# Patient Record
Sex: FEMALE | Race: ASIAN | NOT HISPANIC OR LATINO | Employment: UNEMPLOYED | URBAN - METROPOLITAN AREA
[De-identification: names, ages, dates, MRNs, and addresses within clinical notes are randomized per-mention and may not be internally consistent; named-entity substitution may affect disease eponyms.]

---

## 2017-01-06 ENCOUNTER — OFFICE VISIT - HEALTHEAST (OUTPATIENT)
Dept: FAMILY MEDICINE | Facility: CLINIC | Age: 47
End: 2017-01-06

## 2017-01-06 DIAGNOSIS — M54.9 BACK PAIN: ICD-10-CM

## 2017-01-06 DIAGNOSIS — R30.0 DYSURIA: ICD-10-CM

## 2017-01-11 ENCOUNTER — OFFICE VISIT - HEALTHEAST (OUTPATIENT)
Dept: FAMILY MEDICINE | Facility: CLINIC | Age: 47
End: 2017-01-11

## 2017-01-11 DIAGNOSIS — Z12.31 OTHER SCREENING MAMMOGRAM: ICD-10-CM

## 2017-01-11 DIAGNOSIS — M25.50 ARTHRALGIA: ICD-10-CM

## 2017-01-11 DIAGNOSIS — Z23 NEED FOR TETANUS BOOSTER: ICD-10-CM

## 2017-01-11 DIAGNOSIS — M54.50 LOW BACK PAIN: ICD-10-CM

## 2017-01-11 DIAGNOSIS — R30.0 DYSURIA: ICD-10-CM

## 2017-01-23 ENCOUNTER — RECORDS - HEALTHEAST (OUTPATIENT)
Dept: MAMMOGRAPHY | Facility: CLINIC | Age: 47
End: 2017-01-23

## 2017-01-23 DIAGNOSIS — Z12.31 ENCOUNTER FOR SCREENING MAMMOGRAM FOR MALIGNANT NEOPLASM OF BREAST: ICD-10-CM

## 2017-02-10 ENCOUNTER — COMMUNICATION - HEALTHEAST (OUTPATIENT)
Dept: FAMILY MEDICINE | Facility: CLINIC | Age: 47
End: 2017-02-10

## 2017-02-10 DIAGNOSIS — K29.70 GASTRITIS: ICD-10-CM

## 2017-02-27 ENCOUNTER — RECORDS - HEALTHEAST (OUTPATIENT)
Dept: ADMINISTRATIVE | Facility: OTHER | Age: 47
End: 2017-02-27

## 2017-03-01 ENCOUNTER — OFFICE VISIT - HEALTHEAST (OUTPATIENT)
Dept: FAMILY MEDICINE | Facility: CLINIC | Age: 47
End: 2017-03-01

## 2017-03-01 DIAGNOSIS — M54.50 LOW BACK PAIN: ICD-10-CM

## 2017-03-01 DIAGNOSIS — M25.50 ARTHRALGIA: ICD-10-CM

## 2017-03-01 DIAGNOSIS — M54.2 CERVICAL PAIN: ICD-10-CM

## 2017-03-01 ASSESSMENT — MIFFLIN-ST. JEOR: SCORE: 1003.91

## 2017-05-05 ENCOUNTER — OFFICE VISIT - HEALTHEAST (OUTPATIENT)
Dept: FAMILY MEDICINE | Facility: CLINIC | Age: 47
End: 2017-05-05

## 2017-05-05 DIAGNOSIS — R51.9 HEADACHE: ICD-10-CM

## 2017-05-05 DIAGNOSIS — M54.2 NECK PAIN: ICD-10-CM

## 2017-05-05 ASSESSMENT — MIFFLIN-ST. JEOR: SCORE: 1001.64

## 2017-06-23 ENCOUNTER — COMMUNICATION - HEALTHEAST (OUTPATIENT)
Dept: NURSING | Facility: CLINIC | Age: 47
End: 2017-06-23

## 2017-06-28 ENCOUNTER — COMMUNICATION - HEALTHEAST (OUTPATIENT)
Dept: NURSING | Facility: CLINIC | Age: 47
End: 2017-06-28

## 2017-07-03 ENCOUNTER — OFFICE VISIT - HEALTHEAST (OUTPATIENT)
Dept: RHEUMATOLOGY | Facility: CLINIC | Age: 47
End: 2017-07-03

## 2017-07-03 DIAGNOSIS — M79.642 LEFT HAND PAIN: ICD-10-CM

## 2017-07-03 DIAGNOSIS — M65.332 TRIGGER FINGER, LEFT MIDDLE FINGER: ICD-10-CM

## 2018-06-22 ENCOUNTER — COMMUNICATION - HEALTHEAST (OUTPATIENT)
Dept: FAMILY MEDICINE | Facility: CLINIC | Age: 48
End: 2018-06-22

## 2018-06-27 ENCOUNTER — OFFICE VISIT - HEALTHEAST (OUTPATIENT)
Dept: FAMILY MEDICINE | Facility: CLINIC | Age: 48
End: 2018-06-27

## 2018-06-27 ENCOUNTER — COMMUNICATION - HEALTHEAST (OUTPATIENT)
Dept: FAMILY MEDICINE | Facility: CLINIC | Age: 48
End: 2018-06-27

## 2018-06-27 DIAGNOSIS — N64.4 BREAST PAIN: ICD-10-CM

## 2018-06-27 DIAGNOSIS — Z12.31 VISIT FOR SCREENING MAMMOGRAM: ICD-10-CM

## 2018-07-02 ENCOUNTER — RECORDS - HEALTHEAST (OUTPATIENT)
Dept: MAMMOGRAPHY | Facility: CLINIC | Age: 48
End: 2018-07-02

## 2018-07-02 DIAGNOSIS — Z12.31 ENCOUNTER FOR SCREENING MAMMOGRAM FOR MALIGNANT NEOPLASM OF BREAST: ICD-10-CM

## 2018-11-07 ENCOUNTER — COMMUNICATION - HEALTHEAST (OUTPATIENT)
Dept: FAMILY MEDICINE | Facility: CLINIC | Age: 48
End: 2018-11-07

## 2018-11-12 ENCOUNTER — OFFICE VISIT - HEALTHEAST (OUTPATIENT)
Dept: FAMILY MEDICINE | Facility: CLINIC | Age: 48
End: 2018-11-12

## 2018-11-12 DIAGNOSIS — R51.9 NONINTRACTABLE HEADACHE, UNSPECIFIED CHRONICITY PATTERN, UNSPECIFIED HEADACHE TYPE: ICD-10-CM

## 2018-11-12 DIAGNOSIS — M65.30 TRIGGER FINGER, ACQUIRED: ICD-10-CM

## 2018-11-12 ASSESSMENT — MIFFLIN-ST. JEOR: SCORE: 1012.98

## 2019-01-25 ENCOUNTER — COMMUNICATION - HEALTHEAST (OUTPATIENT)
Dept: FAMILY MEDICINE | Facility: CLINIC | Age: 49
End: 2019-01-25

## 2019-01-29 ENCOUNTER — OFFICE VISIT - HEALTHEAST (OUTPATIENT)
Dept: FAMILY MEDICINE | Facility: CLINIC | Age: 49
End: 2019-01-29

## 2019-01-29 DIAGNOSIS — M79.602 PAIN OF LEFT UPPER EXTREMITY: ICD-10-CM

## 2019-01-29 DIAGNOSIS — M25.50 ARTHRALGIA, UNSPECIFIED JOINT: ICD-10-CM

## 2019-01-29 DIAGNOSIS — M79.605 PAIN OF LEFT LOWER EXTREMITY: ICD-10-CM

## 2019-01-29 ASSESSMENT — MIFFLIN-ST. JEOR: SCORE: 1010.72

## 2019-02-26 ENCOUNTER — COMMUNICATION - HEALTHEAST (OUTPATIENT)
Dept: ADMINISTRATIVE | Facility: CLINIC | Age: 49
End: 2019-02-26

## 2019-02-26 ENCOUNTER — OFFICE VISIT - HEALTHEAST (OUTPATIENT)
Dept: FAMILY MEDICINE | Facility: CLINIC | Age: 49
End: 2019-02-26

## 2019-02-26 DIAGNOSIS — M79.602 PAIN OF LEFT UPPER EXTREMITY: ICD-10-CM

## 2019-02-26 DIAGNOSIS — M79.605 PAIN OF LEFT LOWER EXTREMITY: ICD-10-CM

## 2019-02-26 DIAGNOSIS — M25.50 ARTHRALGIA, UNSPECIFIED JOINT: ICD-10-CM

## 2019-02-26 DIAGNOSIS — M65.30 TRIGGER FINGER, ACQUIRED: ICD-10-CM

## 2019-02-26 RX ORDER — IBUPROFEN 400 MG/1
TABLET, FILM COATED ORAL
Qty: 40 TABLET | Refills: 1 | Status: SHIPPED | OUTPATIENT
Start: 2019-02-26 | End: 2021-12-22

## 2019-03-06 ENCOUNTER — COMMUNICATION - HEALTHEAST (OUTPATIENT)
Dept: FAMILY MEDICINE | Facility: CLINIC | Age: 49
End: 2019-03-06

## 2019-03-11 ENCOUNTER — OFFICE VISIT - HEALTHEAST (OUTPATIENT)
Dept: FAMILY MEDICINE | Facility: CLINIC | Age: 49
End: 2019-03-11

## 2019-03-11 ENCOUNTER — COMMUNICATION - HEALTHEAST (OUTPATIENT)
Dept: ADMINISTRATIVE | Facility: CLINIC | Age: 49
End: 2019-03-11

## 2019-03-11 DIAGNOSIS — G56.00 CARPAL TUNNEL SYNDROME, UNSPECIFIED LATERALITY: ICD-10-CM

## 2019-03-11 DIAGNOSIS — M65.30 TRIGGER FINGER, ACQUIRED: ICD-10-CM

## 2019-03-11 DIAGNOSIS — K29.50 CHRONIC GASTRITIS WITHOUT BLEEDING, UNSPECIFIED GASTRITIS TYPE: ICD-10-CM

## 2019-03-22 ENCOUNTER — RECORDS - HEALTHEAST (OUTPATIENT)
Dept: ADMINISTRATIVE | Facility: OTHER | Age: 49
End: 2019-03-22

## 2019-07-19 ENCOUNTER — RECORDS - HEALTHEAST (OUTPATIENT)
Dept: ADMINISTRATIVE | Facility: OTHER | Age: 49
End: 2019-07-19

## 2019-08-20 ENCOUNTER — COMMUNICATION - HEALTHEAST (OUTPATIENT)
Dept: FAMILY MEDICINE | Facility: CLINIC | Age: 49
End: 2019-08-20

## 2019-08-28 ENCOUNTER — COMMUNICATION - HEALTHEAST (OUTPATIENT)
Dept: ADMINISTRATIVE | Facility: CLINIC | Age: 49
End: 2019-08-28

## 2019-08-28 ENCOUNTER — OFFICE VISIT - HEALTHEAST (OUTPATIENT)
Dept: FAMILY MEDICINE | Facility: CLINIC | Age: 49
End: 2019-08-28

## 2019-08-28 DIAGNOSIS — M65.30 TRIGGER FINGER, ACQUIRED: ICD-10-CM

## 2019-08-28 DIAGNOSIS — M48.02 CERVICAL STENOSIS OF SPINAL CANAL: ICD-10-CM

## 2019-08-28 DIAGNOSIS — G56.00 CARPAL TUNNEL SYNDROME, UNSPECIFIED LATERALITY: ICD-10-CM

## 2019-08-28 RX ORDER — GABAPENTIN 300 MG/1
300 CAPSULE ORAL
Status: SHIPPED | COMMUNITY
Start: 2018-03-05 | End: 2021-12-22

## 2019-09-05 ENCOUNTER — HOSPITAL ENCOUNTER (OUTPATIENT)
Dept: MRI IMAGING | Facility: HOSPITAL | Age: 49
Discharge: HOME | End: 2019-09-05
Attending: FAMILY MEDICINE

## 2019-09-05 DIAGNOSIS — M48.02 CERVICAL STENOSIS OF SPINAL CANAL: ICD-10-CM

## 2019-09-09 ENCOUNTER — HOSPITAL ENCOUNTER (OUTPATIENT)
Dept: PHYSICAL MEDICINE AND REHAB | Facility: CLINIC | Age: 49
Discharge: HOME | End: 2019-09-09
Attending: FAMILY MEDICINE

## 2019-09-09 DIAGNOSIS — M54.42 CHRONIC LEFT-SIDED LOW BACK PAIN WITH LEFT-SIDED SCIATICA: ICD-10-CM

## 2019-09-09 DIAGNOSIS — G89.29 CHRONIC LEFT-SIDED LOW BACK PAIN WITH LEFT-SIDED SCIATICA: ICD-10-CM

## 2019-09-09 DIAGNOSIS — M54.16 LEFT LUMBAR RADICULITIS: ICD-10-CM

## 2019-09-09 DIAGNOSIS — M79.642 LEFT HAND PAIN: ICD-10-CM

## 2019-09-09 DIAGNOSIS — M48.02 FORAMINAL STENOSIS OF CERVICAL REGION: ICD-10-CM

## 2019-09-09 DIAGNOSIS — M48.02 CERVICAL STENOSIS OF SPINAL CANAL: ICD-10-CM

## 2019-09-09 DIAGNOSIS — M54.2 NECK PAIN ON LEFT SIDE: ICD-10-CM

## 2019-09-13 ENCOUNTER — COMMUNICATION - HEALTHEAST (OUTPATIENT)
Dept: ADMINISTRATIVE | Facility: CLINIC | Age: 49
End: 2019-09-13

## 2019-10-11 ENCOUNTER — OFFICE VISIT - HEALTHEAST (OUTPATIENT)
Dept: FAMILY MEDICINE | Facility: CLINIC | Age: 49
End: 2019-10-11

## 2019-10-11 DIAGNOSIS — R31.9 URINARY TRACT INFECTION WITH HEMATURIA, SITE UNSPECIFIED: ICD-10-CM

## 2019-10-11 DIAGNOSIS — N39.0 URINARY TRACT INFECTION WITH HEMATURIA, SITE UNSPECIFIED: ICD-10-CM

## 2019-10-11 DIAGNOSIS — R31.9 BLOOD IN URINE: ICD-10-CM

## 2019-10-11 LAB
ALBUMIN UR-MCNC: ABNORMAL MG/DL
APPEARANCE UR: CLEAR
BACTERIA #/AREA URNS HPF: ABNORMAL HPF
BILIRUB UR QL STRIP: ABNORMAL
CAOX CRY #/AREA URNS HPF: PRESENT /[HPF]
COLOR UR AUTO: YELLOW
GLUCOSE UR STRIP-MCNC: NEGATIVE MG/DL
HGB UR QL STRIP: ABNORMAL
KETONES UR STRIP-MCNC: ABNORMAL MG/DL
LEUKOCYTE ESTERASE UR QL STRIP: ABNORMAL
NITRATE UR QL: POSITIVE
PH UR STRIP: 7 [PH] (ref 5–8)
RBC #/AREA URNS AUTO: >100 HPF
SP GR UR STRIP: 1.02 (ref 1–1.03)
SQUAMOUS #/AREA URNS AUTO: ABNORMAL LPF
UROBILINOGEN UR STRIP-ACNC: ABNORMAL
WBC #/AREA URNS AUTO: ABNORMAL HPF

## 2019-10-11 ASSESSMENT — MIFFLIN-ST. JEOR: SCORE: 1012.08

## 2019-10-12 LAB — BACTERIA SPEC CULT: NO GROWTH

## 2020-01-23 ENCOUNTER — OFFICE VISIT - HEALTHEAST (OUTPATIENT)
Dept: FAMILY MEDICINE | Facility: CLINIC | Age: 50
End: 2020-01-23

## 2020-01-23 DIAGNOSIS — N12 PYELONEPHRITIS: ICD-10-CM

## 2020-01-23 DIAGNOSIS — M65.312 TRIGGER THUMB, LEFT THUMB: ICD-10-CM

## 2020-01-27 ENCOUNTER — OFFICE VISIT - HEALTHEAST (OUTPATIENT)
Dept: FAMILY MEDICINE | Facility: CLINIC | Age: 50
End: 2020-01-27

## 2020-01-27 DIAGNOSIS — T78.40XA ALLERGIC REACTION, INITIAL ENCOUNTER: ICD-10-CM

## 2020-01-27 ASSESSMENT — MIFFLIN-ST. JEOR: SCORE: 996.82

## 2020-11-24 ENCOUNTER — OFFICE VISIT - HEALTHEAST (OUTPATIENT)
Dept: FAMILY MEDICINE | Facility: CLINIC | Age: 50
End: 2020-11-24

## 2020-11-24 DIAGNOSIS — K29.70 GASTRITIS: ICD-10-CM

## 2020-11-24 DIAGNOSIS — M48.02 CERVICAL STENOSIS OF SPINAL CANAL: ICD-10-CM

## 2020-11-24 RX ORDER — CALCIUM CARBONATE 500 MG/1
TABLET, CHEWABLE ORAL
Qty: 90 TABLET | Refills: 6 | Status: SHIPPED | OUTPATIENT
Start: 2020-11-24 | End: 2021-12-22

## 2020-11-24 RX ORDER — ACETAMINOPHEN 500 MG
1000 TABLET ORAL EVERY 6 HOURS PRN
Qty: 100 TABLET | Refills: 2 | Status: SHIPPED | OUTPATIENT
Start: 2020-11-24 | End: 2024-01-03

## 2021-03-09 ENCOUNTER — COMMUNICATION - HEALTHEAST (OUTPATIENT)
Dept: MAMMOGRAPHY | Facility: CLINIC | Age: 51
End: 2021-03-09

## 2021-05-30 VITALS — BODY MASS INDEX: 27.59 KG/M2 | WEIGHT: 118.7 LBS

## 2021-05-30 VITALS — BODY MASS INDEX: 27.22 KG/M2 | WEIGHT: 121 LBS | HEIGHT: 56 IN

## 2021-05-30 VITALS — BODY MASS INDEX: 27.19 KG/M2 | WEIGHT: 117 LBS

## 2021-05-31 VITALS — BODY MASS INDEX: 27.11 KG/M2 | WEIGHT: 120.5 LBS | HEIGHT: 56 IN

## 2021-05-31 VITALS — WEIGHT: 122 LBS | BODY MASS INDEX: 27.85 KG/M2

## 2021-05-31 NOTE — TELEPHONE ENCOUNTER
Who is calling:  son  Reason for Call:  Scheduled his mom for 08/28 and would like clinic to help arrange transportation.    Date of last appointment with primary care: 02/26/2019  Okay to leave a detailed message: Yes with son.

## 2021-05-31 NOTE — TELEPHONE ENCOUNTER
I spoke with ingris  from [email or web address] for a  of 915 am     Patient has been scheduled with TRIP ID#:  [id number]

## 2021-05-31 NOTE — TELEPHONE ENCOUNTER
Patient Demographics  : 1970  Phone #: [phone]  Address:   [address] [place] [address]    Insurance ID # / Name of Insurance  [id number] - (Blue [employer] Blue Shield)      Patient is needing a ride for their appointment on:        Date: 2019  Time: 8;40    Name of Location/Address/Phone #:   [facility]  [facility] [facility] [address]  [place] [address]  Phone: [phone]    Name of  patient is seeing & 's specialty:   N/A    Passenger (s):   1    Special considerations: None    Is the patient able to walk to the transportation vehicle?    Yes     Do they need DOOR to DOOR service? (company person comes knock on the door and walks them to car)     No    Please call patient back to confirm the ride details. Thanks!

## 2021-05-31 NOTE — PROGRESS NOTES
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  We discussed no lifting over 10 pounds and no significant walking.  She did have carpal tunnel syndrome bilaterally    She  has had carpal tunnel surgery.    Please see the form I think she is able to work 20 to 29 hours.    Please see the form    I reviewed her surgery back in 5/2000 19 She Had Right Carpal Tunl. surgery and ganglion cyst on the right middle finger    Also has positive EMG findings for left carpal tunnel surgery and also has a trigger finger on the left.    We discussed her symptoms and the recommendation to go ahead with surgery.  She will think about that.    Regarding her cervical spine, she has had MRI back in December 2017 and showed canal stenosis at C3-4.    Denies any spasticity Denies any urinary or bowel incontinence or saddle anesthesia.    Tobacco status: She  reports that she has never smoked. She has never used smokeless tobacco.    Patient Active Problem List    Diagnosis Date Noted     Left hand pain 07/03/2017       Current Outpatient Medications   Medication Sig Dispense Refill     gabapentin (NEURONTIN) 300 MG capsule Take 300 mg by mouth.       calcium, as carbonate, (TUMS) 200 mg calcium (500 mg) chewable tablet 3 po qd 90 tablet 6     cholecalciferol, vitamin D3, 2,000 unit Tab 1 po daily 90 tablet 1     ibuprofen (ADVIL,MOTRIN) 400 MG tablet 1 po two times a day prn pain 40 tablet 1     omeprazole (PRILOSEC) 20 MG capsule TAKE 1 CAPSULE BY MOUTH EVERY DAY 90 capsule 3     sucralfate (CARAFATE) 1 gram tablet Take 1 g by mouth 4 (four) times a day.       Current Facility-Administered Medications   Medication Dose Route Frequency Provider Last Rate Last Dose     bupivacaine (PF) 0.25% injection 1.25 mg (MARCAINE)  0.5 mL Intra-articular Once Beltran Alva MD           ROS: 10 point review of systems  positive as outlined above otherwise negative    I did review consultation form the orthopedic hand surgeon.    Objective:    /70 (Patient Site: Left Arm, Patient Position: Sitting, Cuff Size: Adult Regular)   Pulse 84   Resp 12   Wt 123 lb 12 oz (56.1 kg)   BMI 28.25 kg/m    Body mass index is 28.25 kg/m .      General appearance no acute distress    Patient has pain in the paraspinal muscles in the neck.  Reflexes were symmetric upper and lower    Negative straight leg raising    Skin was normal no rashes    Lungs clear no rales or rhonchi heart regular S1-S2 rate in the 80s    She has surgical scar from her right carpal tunnel surgery.    Left middle finger triggers at times she states and did have positive Phalen findings.    No significant muscle wasting    Lower extremities without edema no pain through the lower back or legs.        Assessment:  1. Cervical stenosis of spinal canal  Ambulatory referral to Spine Care    MR Cervical Spine Without Contrast   2. Trigger finger, acquired     3. Carpal tunnel syndrome, unspecified laterality       Cervical spinal stenosis,    We will have her see spine care I did order an MRI of her cervical spine    Trigger finger treated on the right still has symptoms on the left middle    Carpal tunnel syndrome, surgery on the right has some ongoing symptoms on the left.    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      Plan: As outlined above, will contact patient regarding the MRI shows C-spine    This transcription uses voice recognition software, which may contain typographical errors.

## 2021-05-31 NOTE — TELEPHONE ENCOUNTER
Patient Demographics  : 1970  Phone #: [phone]  Address:   [address] [place] [address]    Insurance ID # / Name of Insurance  [id number] - ([employer] [facility])      Patient is needing a ride for their appointment on:        Date: 2019  Time: 10;00    Name of Location/Address/Phone #:   [facility]  [address] [facility] [address]    Name of  patient is seeing & 's specialty:   N/A    Passenger (s):   1    Special considerations: None    Is the patient able to walk to the transportation vehicle?    Yes     Do they need DOOR to DOOR service? (company person comes knock on the door and walks them to car)     No    Please call patient back to confirm the ride details. Thanks!

## 2021-05-31 NOTE — TELEPHONE ENCOUNTER
I spoke with ingris  from [employer].    Patient has been scheduled with Raheem [phone] for a  time of 755 am  round trip.   TRIP ID#:  [id number]

## 2021-06-01 VITALS — WEIGHT: 124 LBS | BODY MASS INDEX: 28.3 KG/M2

## 2021-06-01 NOTE — PROGRESS NOTES
ASSESSMENT: Geronimo Dumont is a 49 y.o. female presents for consultation at the request of HE PCP Beltran Alva MD, with past medical history significant for vitamin D deficiency, bilateral hand pain, left trigger finger, history of carpal tunnel surgery, who presents today for new patient evaluation of :     -Chronic left greater than right low back pain with left lumbar radiculitis L5-S1 dermatomal pattern x4 to 5 years. Patient is neurologically intact on exam. No myelopathic or red flag symptoms.      -Mild chronic left anterior neck pain most consistent with myofascial pain.    -Left hand pain post carpal tunnel surgery [facility], likely related to excessive scar tissue, has worked with OT for this.    NDI Score: 58    Diagnoses and all orders for this visit:    Chronic left-sided low back pain with left-sided sciatica  -     Ambulatory referral to PT/OT  -     XR Lumbar Spine 2 or 3 VWS; Future; Expected date: 09/09/2019    Left lumbar radiculitis  -     Ambulatory referral to PT/OT  -     XR Lumbar Spine 2 or 3 VWS; Future; Expected date: 09/09/2019    Neck pain on left side  -     Ambulatory referral to PT/OT    Cervical stenosis of spinal canal  -     Ambulatory referral to PT/OT    Foraminal stenosis of cervical region  -     Ambulatory referral to PT/OT    Left hand pain  -     Ambulatory referral to PT/OT       PLAN:  Reviewed spine anatomy and disease process. Discussed diagnosis and treatment options with the patient today. A shared decision making model was used. The patient's values and choices were respected. The following represents what was discussed and decided upon by the provider and the patient.     -DIAGNOSTIC TESTS:  Images were personally reviewed and interpreted and explained to patient today using spine model.   --Ordered lumbar spine x-ray today to further evaluate chronic left low back pain and left sciatica.  Patient is not interested in another MRI at this time.  --Cervical  spine MRI 9/5/2019 shows multilevel degenerative changes with disc osteophyte complex at C4-5 with mild to moderate spinal stenosis and mild to moderate left foraminal stenosis.  Previous disc protrusion at C3-4 has improved, however still remains moderate central stenosis, no cord signal abnormalities noted.  Congenital mild narrowing of the spinal canal.    -PHYSICAL THERAPY: Referral to physical [facility] to establish exercises and stretches for neck and back pain.  Discussed the importance of core strengthening, ROM, stretching exercises with the patient and how each of these entities is important in decreasing pain.  Explained to the patient that the purpose of physical therapy is to teach the patient a home exercise program.  These exercises need to be performed every day in order to decrease pain and prevent future occurrences of pain.  Likened it to brushing one's teeth.      -MEDICATIONS: No changes in medications advised patient continue ibuprofen which she has today in clinic but has not trialed 400 mg twice daily as needed for pain.  Discussed multiple medication options today with patient. Discussed risks, side effects, and proper use of medications. Patient verbalized understanding.    -INTERVENTIONS: No recommendations for injections at this time.  Discussed risks and benefits of injections with patient today.    -PATIENT EDUCATION: 45 minutes of total visit time was spent face to face with the patient today, greater than 50% of total time spent with patient was spent on counseling, education, and coordinating care.   -10 minutes spent outside of visit time, non-face-to-face time, reviewing chart.    -FOLLOW-UP:   Follow-up if symptoms are not improving with physical therapy and her symptoms arise.    Advised patient to call the Spine Center if symptoms worsen or you have problems controlling bladder and bowel function.    ______________________________________________________________________    SUBJECTIVE:   Geroinmo Dumont  is a 49 y.o. female who presents today for new patient evaluation of most concerning to her bilateral low back pain that is worse on the left upper lumbar spine that radiates to the lumbosacral junction as well as low left leg into the posterior lateral thigh and posterior lateral calf and left posterior ankle that is been ongoing for the last 4 to 5 years typically worse with generalized activity and does improve with rest.  Patient denies numbness or tingling sensations but does feel sometimes weaker in her left lower extremity, denies any recent trips or falls or balance changes however, denies bowel or bladder loss control, denies saddle anesthesia.    Patient states that she is here today for her back pain however she does have cervical spine MRI imaging completed.  She does report that she has very mild neck pain on the left more in the anterior sternocleidomastoid muscle region but that is tolerable as well.  Patient did have carpal tunnel surgery and does still have some mild pain where the scar is noted and she has worked with occupational therapy for this for scar tissue therapy.  Patient denies upper extremity weakness, denies upper extremity numbness or tingling or constant pain.    -Treatment to Date: No prior spinal surgery.  Occupational Therapy [facility] previously post carpal tunnel surgery for scar tissue adherence massage.  No prior PT for back pain.    -Medications:  Gabapentin 300 mg  Ibuprofen 400 mg, patient has not taken this as she does not know what it was for.    Current Outpatient Medications on File Prior to Encounter   Medication Sig Dispense Refill     ibuprofen (ADVIL,MOTRIN) 400 MG tablet 1 po two times a day prn pain 40 tablet 1     calcium, as carbonate, (TUMS) 200 mg calcium (500 mg) chewable tablet 3 po qd 90 tablet 6     cholecalciferol, vitamin D3, 2,000 unit Tab 1 po  daily 90 tablet 1     gabapentin (NEURONTIN) 300 MG capsule Take 300 mg by mouth.       omeprazole (PRILOSEC) 20 MG capsule TAKE 1 CAPSULE BY MOUTH EVERY DAY 90 capsule 3     sucralfate (CARAFATE) 1 gram tablet Take 1 g by mouth 4 (four) times a day.       Current Facility-Administered Medications on File Prior to Encounter   Medication Dose Route Frequency Provider Last Rate Last Dose     bupivacaine (PF) 0.25% injection 1.25 mg (MARCAINE)  0.5 mL Intra-articular Once Beltran Alva MD           No Known Allergies    No past medical history on file.     Patient Active Problem List   Diagnosis     Left hand pain       No past surgical history on file.    No family history on file.    Reviewed past medical, surgical, and family history with patient found on new patient intake packet located in [employer] tab.     SOCIAL HX: Patient is  and does have children at home that she cares for but is also looking for her current job.  Patient denies smoking/tobacco use, denies alcohol use, denies history being heavy drinker, denies recreational drug use.    ROS: Positive for back pain, sciatica, headache.  Specifically negative for bowel/bladder dysfunction, balance changes, dizziness, foot drop, fevers, chills, appetite changes, nausea/vomiting, unexplained weight loss. Otherwise 13 systems reviewed are negative. Please see the patient's intake questionnaire from today for details.    OBJECTIVE:  /67 (Patient Site: Right Arm, Patient Position: Sitting)   Pulse 82   Wt 128 lb (58.1 kg)   SpO2 98%   BMI 29.22 kg/m      PHYSICAL EXAMINATION:  --CONSTITUTIONAL: Vital signs as above. No acute distress. The patient is well nourished and well groomed.  --PSYCHIATRIC: The patient is awake, alert, oriented to person, place, time and answering questions appropriately with clear speech. Appropriate mood and affect   --HEENT: Sclera are non-injected. Extraocular muscles are intact. Thyroid moves easily upon  swallowing.  Moist oral mucosa.  --SKIN: Skin over the face, bilateral upper extremities, and posterior torso is clean, dry, intact without rashes.  --RESPIRATORY: Normal rhythm and effort. No abnormal accessory muscle breathing patterns noted.   --GROSS MOTOR: Easily arises from a seated position. Toe walking and heel walking are normal.    --CERVICAL SPINE: Inspection reveals no evidence of deformity. Range of motion is not limited in cervical flexion, extension, lateral rotation. No tenderness to palpation cervical spine.  Spurling maneuver negative bilaterally.  --SHOULDERS: Full range of motion bilaterally. Negative empty can.  --UPPER EXTREMITY MOTOR TESTING:  Wrist flexion left 5/5, right 5/5  Wrist extension left 5/5, right 5/5  Pronators left 5/5, right 5/5  Biceps left 5/5, right 5/5   Triceps left 5/5, right 5/5   Shoulder abduction left 5/5, right 5/5   left 5/5, right 5/5  --NEUROLOGIC: CN III-XII are grossly intact. 2/4 symmetric biceps, brachioradialis, triceps reflexes bilaterally. Sensation to upper extremities is intact.  Negative Wagner's bilaterally.    --VASCULAR: 2/4 radial pulses bilaterally. Warm upper limbs bilaterally. Capillary refill in the upper extremities is less than 1 second.    --STANDING EXAMINATION:  Normal lumbar lordosis noted, no lateral shift.  --MUSCULOSKELETAL: Lumbar spine inspection reveals no evidence of deformity. Range of motion is not limited in lumbar flexion, extension, lateral rotation. No tenderness to palpation. Straight leg raising in the supine position is negative to radicular pain. Sciatic notch non-tender.  --SACROILIAC JOINT: One Finger point test negative.  --GROSS MOTOR: Gait is non-antalgic. Easily arises from a seated position. Toe walking and heel walking are normal without significant difficulty.    --LOWER EXTREMITY MOTOR TESTING:  Plantar flexion left 5/5, right 5/5   Dorsiflexion left 5/5, right 5/5   Great toe MTP extension left 5/5, right  5/5  Knee flexion left 5/5, right 5/5  Knee extension left 5/5, right 5/5   Hip flexion left 5/5, right 5/5  Hip abduction left 5/5, right 5/5  Hip adduction left 5/5, right 5/5   --HIPS: Full range of motion bilaterally.   --NEUROLOGICAL:  2/4 patellar, medial hamstring, and achilles reflexes bilaterally.  Sensation to light touch is intact in the bilateral L4, L5, and S1 dermatomes. Babinski is negative. No clonus.  --VASCULAR:  2/4 dorsalis pedis and posterior tibialsi pulses bilaterally.  Bilateral lower extremities are warm.  There is no pitting edema of the bilateral lower extremities.    RESULTS: Prior medical records from [facility] 3/11/2019 to current and care everywhere were reviewed today.     Imaging:  Cervical spine Imaging was personally reviewed and interpreted today. The images were shown to the patient and the findings were explained using a spine model.      Mr Cervical Spine Without Contrast  Result Date: 9/6/2019  EXAM: MR CERVICAL SPINE WO CONTRAST LOCATION: [facility] DATE/TIME: 9/5/2019 11:05 AM INDICATION: C-spine canal stenosis. COMPARISON: 12/8/2017. TECHNIQUE: Without IV contrast. FINDINGS: Straightening of the normal cervical lordosis. Mild congenital baseline narrowing of the spinal canal. Vertebral body heights are maintained. No concerning marrow replacing lesions. No abnormal cord signal. Visualized posterior fossa contents are unremarkable.   C2-C3: Normal disc height. No herniation. No facet arthropathy. No spinal canal stenosis. No right neural foraminal stenosis. No left neural foraminal stenosis.   C3-C4: Minimal posterior disc abnormality. Mild uncovertebral arthropathy. Mild facet arthropathy. Moderate spinal canal stenosis. No significant foraminal stenosis.   C4-C5: Shallow posterior disc osteophyte complex. Mild facet arthropathy. Mild to moderate spinal canal stenosis. No right neural foraminal stenosis. Mild to moderate left neural foraminal stenosis.    C5-C6:  Minimal posterior disc osteophyte complex. Mild facet arthropathy. Mild spinal canal stenosis. No right neural foraminal stenosis. No left neural foraminal stenosis.   C6-C7: No significant posterior disc osteophyte complex. Mild facet arthropathy. No spinal canal stenosis. No right neural foraminal stenosis. No left neural foraminal stenosis.   C7-T1: No significant posterior disc abnormality. No facet arthropathy. No spinal canal stenosis. No right neural foraminal stenosis. No left neural foraminal stenosis.   1.  Mild scattered degenerative changes of the cervical spine. While there is no large posterior disc abnormality, degenerative changes do result in moderate spinal canal stenosis at C3/C4 and mild to moderate spinal canal stenosis at C4/C5. This is predominantly due to the baseline congenital narrowing of the spinal canal.   2.  Previously seen central disc protrusion at C3/C4 has improved from the prior exam.   3.  No high-grade foraminal stenosis.

## 2021-06-01 NOTE — PATIENT INSTRUCTIONS - HE
[facility] Radiology Locations    Please call [phone] to schedule your image(s) (select option #1 and then #2). There are 3 different locations, see below. You can do walk-in visits for xray only images if you want.     [facility]  [address] [place] [id number]    [facility]   [address] [address]    [address] [address]    OR    You can call your [facility] at [phone] and schedule an xray only visit too.

## 2021-06-01 NOTE — TELEPHONE ENCOUNTER
Patient Demographics  : 1970  Phone #: [phone]  Address:   [address] [place] [address]    Insurance ID # / Name of Insurance  [id number] - ([employer])      Patient is needing a ride for their appointment on:        Date: 2019  Time: 8;00    Name of Location/Address/Phone #:   [facility]  [address]  [facility] [address]    Name of  patient is seeing & 's specialty:   N/A    Passenger (s):   1    Special considerations: None    Is the patient able to walk to the transportation vehicle?    Yes     Do they need DOOR to DOOR service? (company person comes knock on the door and walks them to car)     No    Please call patient back to confirm the ride details. Thanks!

## 2021-06-01 NOTE — TELEPHONE ENCOUNTER
I spoke with SHAMIKA from [employer].    Patient has been scheduled with Raheem [phone] for a  time of 7 AM2 round trip.   TRIP ID#:  [id number]

## 2021-06-02 VITALS — BODY MASS INDEX: 27.67 KG/M2 | HEIGHT: 56 IN | WEIGHT: 123 LBS

## 2021-06-02 VITALS — WEIGHT: 124 LBS | BODY MASS INDEX: 28.3 KG/M2

## 2021-06-02 VITALS — BODY MASS INDEX: 27.56 KG/M2 | WEIGHT: 122.5 LBS | HEIGHT: 56 IN

## 2021-06-03 VITALS — WEIGHT: 123.75 LBS | BODY MASS INDEX: 28.25 KG/M2

## 2021-06-03 VITALS
BODY MASS INDEX: 27.62 KG/M2 | RESPIRATION RATE: 16 BRPM | SYSTOLIC BLOOD PRESSURE: 101 MMHG | OXYGEN SATURATION: 98 % | DIASTOLIC BLOOD PRESSURE: 65 MMHG | HEIGHT: 56 IN | WEIGHT: 122.8 LBS | HEART RATE: 75 BPM

## 2021-06-03 VITALS — WEIGHT: 128 LBS | BODY MASS INDEX: 29.22 KG/M2

## 2021-06-04 VITALS
SYSTOLIC BLOOD PRESSURE: 107 MMHG | HEART RATE: 75 BPM | WEIGHT: 119.44 LBS | OXYGEN SATURATION: 97 % | TEMPERATURE: 98.1 F | RESPIRATION RATE: 16 BRPM | HEIGHT: 56 IN | BODY MASS INDEX: 26.87 KG/M2 | DIASTOLIC BLOOD PRESSURE: 68 MMHG

## 2021-06-04 VITALS
HEART RATE: 84 BPM | WEIGHT: 125 LBS | BODY MASS INDEX: 28.53 KG/M2 | SYSTOLIC BLOOD PRESSURE: 129 MMHG | DIASTOLIC BLOOD PRESSURE: 78 MMHG | RESPIRATION RATE: 16 BRPM

## 2021-06-05 VITALS
HEART RATE: 76 BPM | DIASTOLIC BLOOD PRESSURE: 76 MMHG | BODY MASS INDEX: 27.93 KG/M2 | WEIGHT: 122.38 LBS | SYSTOLIC BLOOD PRESSURE: 128 MMHG

## 2021-06-05 NOTE — PROGRESS NOTES
Subjective: Patient comes in for follow-up from the hospital she has [facility] on 1/12/2020 had pyelonephritis.  She had fever abdominal pain dysuria.  Positive UA.  Did grew out E. coli which was sensitive to the Ceftin ear.  She did get Rocephin in the emergency room as well    She denies any active symptoms now no dysuria no fever no vomiting no abdominal pain.    Patient has had left thumb triggering.  She has had trigger fingers that have been treated in the past.  She comes in for evaluation regarding that.  Is a little uncomfortable when it catches.    Tobacco status: She  reports that she has never smoked. She has never used smokeless tobacco.    Patient Active Problem List    Diagnosis Date Noted     Vitamin D deficiency 12/15/2017     Low folate 12/05/2017     Left hand pain 07/03/2017       Current Outpatient Medications   Medication Sig Dispense Refill     calcium, as carbonate, (TUMS) 200 mg calcium (500 mg) chewable tablet 3 po qd 90 tablet 6     cholecalciferol, vitamin D3, 2,000 unit Tab 1 po daily 90 tablet 1     gabapentin (NEURONTIN) 300 MG capsule Take 300 mg by mouth.       ibuprofen (ADVIL,MOTRIN) 400 MG tablet 1 po two times a day prn pain 40 tablet 1     omeprazole (PRILOSEC) 20 MG capsule TAKE 1 CAPSULE BY MOUTH EVERY DAY 90 capsule 3     ondansetron (ZOFRAN) 4 MG tablet Take 1 tablet (4 mg total) by mouth every 6 (six) hours. 12 tablet 0     sucralfate (CARAFATE) 1 gram tablet Take 1 g by mouth 4 (four) times a day.       Current Facility-Administered Medications   Medication Dose Route Frequency Provider Last Rate Last Dose     bupivacaine (PF) 0.25% injection 1.25 mg (MARCAINE)  0.5 mL Intra-articular Once Beltran Alva MD           ROS: 10 point review of systems positive as discussed above otherwise negative    Objective:    /78 (Patient Site: Left Arm, Patient Position: Sitting, Cuff Size: Adult Regular)   Pulse 84   Resp 16   Wt 125 lb (56.7 kg)   BMI 28.53  kg/m    Body mass index is 28.53 kg/m .      General appearance no acute distress    HEENT neck negative supple no adenopathy    Lungs clear no rales or rhonchi heart regular S1-S2 rate at 80    Abdomen nontender no guarding or rebound    Back without CVA pain    Extremities with triggering of the left thumb    Procedure: The thumb was prepped and infiltrated with 1% lidocaine with epinephrine using a tuberculin syringe.  For local anesthesia.    And injected 40 mg per 5 mL Depo-Medrol half of mL along with 1/2 mL of Marcaine.  Using 25-gauge 5/8 inch needle.  There was no complication.    Injection was in through the tendon sheath and the pulley system.    She did get some relief following this.    Results for orders placed or performed during the hospital encounter of 01/12/20   Culture, Urine   Result Value Ref Range    Culture >100,000 col/ml Escherichia coli (!)        Susceptibility    Escherichia coli - TITA     Amoxicillin + Clavulanate 16/8 Intermediate      Ampicillin >16 Resistant      Cefazolin 8 Sensitive      Cefepime <=1 Sensitive      Ceftriaxone <=1 Sensitive      Ciprofloxacin <=0.5 Sensitive      Gentamicin <=2 Sensitive      Levofloxacin <=1 Sensitive      Meropenem <=0.25 Sensitive      Nitrofurantoin 64 Intermediate      Tetracycline <=2 Sensitive      Tobramycin <=2 Sensitive      Trimethoprim + Sulfamethoxazole <=0.5 Sensitive    Urinalysis-UC if Indicated for patients > 12 years   Result Value Ref Range    Color, UA Yellow Colorless, Yellow, Straw, Light Yellow    Clarity, UA Cloudy (!) Clear    Glucose, UA Negative Negative    Bilirubin, UA Negative Negative    Ketones, UA Negative Negative, 60 mg/dL    Specific Gravity, UA 1.014 1.001 - 1.030    Blood, UA Small (!) Negative    pH, UA 6.5 4.5 - 8.0    Protein, UA Trace (!) Negative mg/dL    Urobilinogen, UA <2.0 E.U./dL <2.0 E.U./dL, 2.0 E.U./dL    Nitrite, UA Positive (!) Negative    Leukocytes, UA Large (!) Negative    Bacteria, UA Many  (!) None Seen hpf    RBC, UA 3-5 (!) None Seen, 0-2 hpf    WBC, UA  (!) None Seen, 0-5 hpf    Squam Epithel, UA 5-10 (!) None Seen, 0-5 lpf    WBC Clumps Present (!) None Seen    Mucus, UA Few (!) None Seen lpf   Basic Metabolic Panel   Result Value Ref Range    Sodium 140 136 - 145 mmol/L    Potassium 3.2 (L) 3.5 - 5.0 mmol/L    Chloride 102 98 - 107 mmol/L    CO2 26 22 - 31 mmol/L    Anion Gap, Calculation 12 5 - 18 mmol/L    Glucose 104 70 - 125 mg/dL    Calcium 9.7 8.5 - 10.5 mg/dL    BUN 13 8 - 22 mg/dL    Creatinine 0.82 0.60 - 1.10 mg/dL    GFR MDRD Af Amer >60 >60 mL/min/1.73m2    GFR MDRD Non Af Amer >60 >60 mL/min/1.73m2   Lactic Acid   Result Value Ref Range    Lactic Acid 1.5 0.5 - 2.2 mmol/L   Procalcitonin   Result Value Ref Range    Procalcitonin 0.61 (H) 0.00 - 0.49 ng/mL   HM1 (CBC with Diff)   Result Value Ref Range    WBC 9.7 4.0 - 11.0 thou/uL    RBC 4.80 3.80 - 5.40 mill/uL    Hemoglobin 12.4 12.0 - 16.0 g/dL    Hematocrit 39.8 35.0 - 47.0 %    MCV 83 80 - 100 fL    MCH 25.8 (L) 27.0 - 34.0 pg    MCHC 31.2 (L) 32.0 - 36.0 g/dL    RDW 12.4 11.0 - 14.5 %    Platelets 239 140 - 440 thou/uL    MPV 10.1 8.5 - 12.5 fL    Neutrophils % 77 (H) 50 - 70 %    Lymphocytes % 13 (L) 20 - 40 %    Monocytes % 9 2 - 10 %    Eosinophils % 1 0 - 6 %    Basophils % 0 0 - 2 %    Neutrophils Absolute 7.4 2.0 - 7.7 thou/uL    Lymphocytes Absolute 1.2 0.8 - 4.4 thou/uL    Monocytes Absolute 0.8 0.0 - 0.9 thou/uL    Eosinophils Absolute 0.1 0.0 - 0.4 thou/uL    Basophils Absolute 0.0 0.0 - 0.2 thou/uL       Assessment:  1. Pyelonephritis     2. Trigger thumb, left thumb  methylPREDNISolone acetate injection 20 mg (DEPO-MEDROL)    lidocaine 1%-EPINEPHrine 1:100,000 1 %-1:100,000 injection 0.5 mL (XYLOCAINE W/EPI)    bupivacaine 0.25 % (2.5 mg/mL) injection 1.25 mg (MARCAINE)     Pyelonephritis resolved    Trigger finger with injection as outlined above.    Plan: Follow-up as needed    This transcription uses voice  recognition software, which may contain typographical errors.

## 2021-06-05 NOTE — PROGRESS NOTES
Chief Complaint   Patient presents with     Poss allergic reaction     x4d, after getting trigger finger injection starting itching all over       ASSESSMENT & PLAN:   Diagnoses and all orders for this visit:    Allergic reaction, initial encounter  -     cetirizine (ZYRTEC) 10 MG tablet; Take 1 tablet (10 mg total) by mouth daily for 14 days.  Dispense: 14 tablet; Refill: 0        MDM:  Itching without rash following trigger point injection.  Added methylprednisolone to allergy list.  Cetirizine for itching.  Moisturizer as she does have dry skin.  Recheck if not better in a few days.    Supportive care discussed.  See discharge instructions below for specific recommendations given.    At the end of the encounter, I discussed results, diagnosis, medications. Discussed red flags for immediate return to clinic/ER, as well as indications for follow up if no improvement. Patient and/or caregiver understood and agreed to plan. Patient was stable for discharge.    SUBJECTIVE    HPI:  HPI  [place] presents to the walk-in clinic with   Chief Complaint   Patient presents with     Poss allergic reaction     x4d, after getting trigger finger injection starting itching all over     Trigger finger injection left thumb.  Injection on Thursday, 4 days ago.  Itching started right after, about 1 hour after.  Meds given were Depo-Medrol and Lido with Epinephrine.      +Eyes itchy.      No throat itching.      Associated with: Skin is dry - does not use lotion.      Denies: Rash.        See ROS for additional symptoms and/or pertinent negatives.     Daughter interpreting.      History obtained from the patient.    No past medical history on file.    Active Ambulatory (Non-Hospital) Problems    Diagnosis     Vitamin D deficiency     Low folate     Left hand pain       No family history on file.    Social History     Tobacco Use     Smoking status: Never Smoker     Smokeless tobacco: Never Used   Substance Use Topics     Alcohol use:  "Not on file       Review of Systems   All other systems reviewed and are negative.      OBJECTIVE    Vitals:    01/27/20 1640   BP: 107/68   Patient Site: Left Arm   Patient Position: Sitting   Cuff Size: Adult Regular   Pulse: 75   Resp: 16   Temp: 98.1  F (36.7  C)   TempSrc: Oral   SpO2: 97%   Weight: 119 lb 7 oz (54.2 kg)   Height: 4' 7.5\" (1.41 m)       Physical Exam  Constitutional:       General: She is not in acute distress.     Appearance: She is well-developed.   HENT:      Right Ear: External ear normal.      Left Ear: External ear normal.   Eyes:      General:         Right eye: No discharge.         Left eye: No discharge.      Conjunctiva/sclera: Conjunctivae normal.   Pulmonary:      Effort: Pulmonary effort is normal.   Musculoskeletal: Normal range of motion.   Skin:     General: Skin is warm and dry.      Capillary Refill: Capillary refill takes less than 2 seconds.      Comments: Dry skin noted with scratch marks in dry areas, no rash seen.  No swelling etc around injection site.     Neurological:      Mental Status: She is alert and oriented to person, place, and time.   Psychiatric:         Behavior: Behavior normal.         Thought Content: Thought content normal.         Judgment: Judgment normal.         Labs:  No results found for this or any previous visit (from the past 240 hour(s)).      Radiology:    No results found.    PATIENT INSTRUCTIONS:   Patient Instructions   Cetirizine as needed for itching.    Purchase moisturizing lotion and apply all over legs/arms.  Look for Sarna, if that's not available, try any unscented lotion.  Use frequently in the Winter.      Recheck in a few days if not better.                   "

## 2021-06-05 NOTE — PATIENT INSTRUCTIONS - HE
Cetirizine as needed for itching.    Purchase moisturizing lotion and apply all over legs/arms.  Look for Sarna, if that's not available, try any unscented lotion.  Use frequently in the Winter.      Recheck in a few days if not better.

## 2021-06-08 NOTE — PROGRESS NOTES
Subjective: This patient comes in for follow-up.  She was seen previously in November for some arthralgia symptoms of low back pain.  She was treated with prednisone which helped.    She was at walk-in clinic on January 6 of this year and had evaluation she had had some dysuria.  The urine was negative    She did have E. coli UTI back in September    She states that her symptoms regarding the urine have now resolved also.    Healthcare maintenance issues were discussed and she'll schedule for a physical down the line she was referred for a mammogram and received a DTaP shot here today    Tobacco status: She  reports that she has never smoked. She does not have any smokeless tobacco history on file.    There are no active problems to display for this patient.      Current Outpatient Prescriptions   Medication Sig Dispense Refill     acetaminophen (TYLENOL EXTRA STRENGTH) 500 MG tablet 1-2 po tid as needed for pain 60 tablet 1     ibuprofen (ADVIL,MOTRIN) 600 MG tablet   0     omeprazole (PRILOSEC) 20 MG capsule 1po qd 30 capsule 3     sucralfate (CARAFATE) 1 gram tablet Take 1 g by mouth 4 (four) times a day.       No current facility-administered medications for this visit.        ROS:   10 point review of systems negative other than penicillin above    Objective:    Visit Vitals     /76 (Patient Site: Right Arm, Patient Position: Sitting, Cuff Size: Adult Regular)     Pulse 76     Resp 12     Wt 117 lb (53.1 kg)     LMP 01/11/2016     BMI 27.19 kg/m2     Body mass index is 27.19 kg/(m^2).      General appearance no acute distress    HEENT neck is supple oropharynx is clear    Lungs were clear no rales or rhonchi no joint swelling redness or warmth    Her lower back has minimal tenderness    No CVA pain    Urine was reviewed please see below, essentially negative.    Results for orders placed or performed in visit on 01/06/17   Urinalysis-UC if Indicated   Result Value Ref Range    Color, UA Yellow Colorless,  Yellow, Straw, Light Yellow    Clarity, UA Clear Clear    Glucose, UA Negative Negative    Bilirubin, UA Negative Negative    Ketones, UA Negative Negative    Specific Gravity, UA 1.020 1.002 - 1.030    Blood, UA Small (!) Negative    pH, UA 7.0 4.5 - 8.0    Protein, UA Negative Negative mg/dL    Urobilinogen, UA 0.2 E.U./dL 0.2 E.U./dL, 1.0 E.U./dL    Nitrite, UA Negative Negative    Leukocytes, UA Negative Negative    Bacteria, UA Few (!) None Seen hpf    RBC, UA 3-5 (!) None Seen, 0-2 hpf    WBC, UA None Seen None Seen, 0-5 hpf    Squam Epithel, UA 5-10 (!) None Seen, 0-5 lpf       Assessment:  1. Low back pain     2. Need for tetanus booster  Tdap vaccine,  8yo or older,  IM   3. Other screening mammogram  Mammo Screening Bilateral   4. Arthralgia     5. Dysuria       Low back pain improved continue stretching exercises    Immunization update    Referral for mammogram    Arthralgia symptoms minimal    Dysuria resolved    Plan:  As outlined above follow-up as needed    This transcription uses voice recognition software, which may contain typographical errors.

## 2021-06-08 NOTE — PROGRESS NOTES
ASSESSMENT:  1. Dysuria  Urinalysis-UC if Indicated   2. Back pain       Results for orders placed or performed in visit on 01/06/17   Urinalysis-UC if Indicated   Result Value Ref Range    Color, UA Yellow Colorless, Yellow, Straw, Light Yellow    Clarity, UA Clear Clear    Glucose, UA Negative Negative    Bilirubin, UA Negative Negative    Ketones, UA Negative Negative    Specific Gravity, UA 1.020 1.002 - 1.030    Blood, UA Small (!) Negative    pH, UA 7.0 4.5 - 8.0    Protein, UA Negative Negative mg/dL    Urobilinogen, UA 0.2 E.U./dL 0.2 E.U./dL, 1.0 E.U./dL    Nitrite, UA Negative Negative    Leukocytes, UA Negative Negative    Bacteria, UA Few (!) None Seen hpf    RBC, UA 3-5 (!) None Seen, 0-2 hpf    WBC, UA None Seen None Seen, 0-5 hpf    Squam Epithel, UA 5-10 (!) None Seen, 0-5 lpf     Not clearly a UTI on UA, will culture urine and tx if indicated. No hx of kidney stones & although minimal blood in the urine, I do not believe this is the cause of symptoms. May be musculoskeletal but not worse with movement.     PLAN:  Dysuria and back pain  Will culture and tx if indicated  Advised adequate rest and hydration with a minimum of 2-3 liters non-caffienated beverage per day.   Take Tylenol as needed for pain  Advised if develops fever, chills, nausea, vomiting, or flank pain,return to clinic for further evaluation.  Return to clinic if symptoms are not improving as expected or if worsening in any way.       SUBJECTIVE:   Geronimo Dumont is a 47 y.o. female presents with [employer]  for 8 days complaint of dysuria and right flank pain that goes up into her neck and down to her legs. Associated symptoms are: urinary frequency and pelvic pain. Denies hematuria, fever, chills, nausea and vomiting. She does not have STD concerns. Declines testing.  No history of pyelonephritis or kidney stones.  Patient has taken a wally medication for her symptoms without improvement. Feels similar to UTI she's had previously.  Denies any heavy lifting or strenuous exercise prior to onset of pain. Not made worse with activity.     No past medical history on file.    Current Medications:  Current Outpatient Prescriptions on File Prior to Visit   Medication Sig Dispense Refill     omeprazole (PRILOSEC) 20 MG capsule 1po qd 30 capsule 3     acetaminophen (TYLENOL EXTRA STRENGTH) 500 MG tablet 1-2 po tid as needed for pain 60 tablet 1     codeine-guaiFENesin (GUAIFENESIN AC)  mg/5 mL liquid Take 10 mL by mouth 3 (three) times a day as needed. 240 mL 0     predniSONE (DELTASONE) 10 MG tablet Take 40mg by mouth daily for 4 days, then 30mg x 4 days, then 20mg x 4 days ,then 10mg x 4 days then stop 40 tablet 0     sucralfate (CARAFATE) 1 gram tablet Take 1 g by mouth 4 (four) times a day.       [DISCONTINUED] azithromycin (ZITHROMAX Z-RICKIE) 250 MG tablet Take 2 tablets (500 mg) on  Day 1,  followed by 1 tablet (250 mg) once daily on Days 2 through 5. 6 tablet 0     No current facility-administered medications on file prior to visit.        Allergies:  No Known Allergies    OBJECTIVE:  Visit Vitals     /76     Pulse 75     Temp 97.8  F (36.6  C) (Oral)     Resp 14     Wt 118 lb 11.2 oz (53.8 kg)     SpO2 100%     BMI 27.59 kg/m2       Physical exam reveals a pleasant 47 y.o. female   Appears healthy, alert and cooperative  Lungs: Chest is clear, no wheezing or rales. Symmetric air entry throughout both lung fields.  Cardiac: regular rate and rhythm, no murmur rub or gallop  Abdomen: soft, no hepatosplenomegaly. Mild pelvic and RUQ tenderness with palpation. Mild right CVA tenderness

## 2021-06-09 NOTE — PROGRESS NOTES
"Subjective: Patient comes in for evaluation is a 47-year-old female.  Patient has had some ongoing problems again with her neck and her lower back as well as her hands wrists and knees.  She complains of diffuse joint pain.    She had labs back in November, sed rate was 42 rheumatoid factor negative ROBINSON negative.  She did respond to a short course on prednisone.    She doesn't have any swelling visible but does feel like her joints are puffy.  She feels when she moves she feels stiffness.    Tobacco status: She  reports that she has never smoked. She does not have any smokeless tobacco history on file.    There are no active problems to display for this patient.      Current Outpatient Prescriptions   Medication Sig Dispense Refill     acetaminophen (TYLENOL EXTRA STRENGTH) 500 MG tablet 1-2 po tid as needed for pain 60 tablet 1     ibuprofen (ADVIL,MOTRIN) 600 MG tablet   0     omeprazole (PRILOSEC) 20 MG capsule TAKE 1 CAPSULE BY MOUTH EVERY DAY 90 capsule 3     sucralfate (CARAFATE) 1 gram tablet Take 1 g by mouth 4 (four) times a day.       sulindac (CLINORIL) 150 MG tablet Take 1 tablet (150 mg total) by mouth 2 (two) times a day. 40 tablet 1     No current facility-administered medications for this visit.        ROS:   Review of systems negative other than as outlined no rashes no    Objective:    Visit Vitals     /70 (Patient Site: Right Arm, Patient Position: Sitting, Cuff Size: Adult Regular)     Pulse 88     Temp 98.3  F (36.8  C) (Oral)     Resp 16     Ht 4' 7.5\" (1.41 m)     Wt 121 lb (54.9 kg)     LMP 02/20/2017 (Exact Date)     BMI 27.62 kg/m2     Body mass index is 27.62 kg/(m^2).      General appearance no acute distress    Vital signs are stable afebrile    Skin was normal no rashes no worrisome lesions.    HEENT neck with some paraspinal tenderness and through the cervical area mild occipital discomfort for range of motion    No radicular findings.  She does complain of pain though in the " elbow wrist and hands more in the hands.    Pulses were full no synovitis no swelling    Knees without effusion hips were stable.    Redness warmth or swelling.    Heart was regular S1-S2    Lungs are clear no rales or rhonchi.    Abdomen soft nontender no masses skin was normal    No focal weakness.    Previous labs as discussed above        Assessment:  1. Arthralgia  sulindac (CLINORIL) 150 MG tablet    Ambulatory referral to Rheumatology   2. Low back pain  sulindac (CLINORIL) 150 MG tablet    Ambulatory referral to Rheumatology   3. Cervical pain  sulindac (CLINORIL) 150 MG tablet    Ambulatory referral to Rheumatology     Arthralgia persisting we'll treat with sulindac 150 mg twice a day    Upper and lower back pain is part of the joint problems.  We'll have her go on to see rheumatology    Hold off on ibuprofen, she can use Tylenol as needed.    Will await rheumatology consultation    Plan:  As above    This transcription uses voice recognition software, which may contain typographical errors.

## 2021-06-10 NOTE — PROGRESS NOTES
Chief Complaint:  Chief Complaint   Patient presents with     Headache     has had for 1 year - yesterday got worse     Dizziness     Tingling     both arms and legs worse since yesterday     HPI:   Geronimo Dumont is a 47 y.o. female c/o chronic head and neck pain.  This is been a problem for her for years.  She has been seen by previous providers for this.  I reviewed her last note with Dr. Alva.  She has had some lab workup in the past.  He referred her to rheumatology.  She has not made an appointment there yet.  She feels like her neck, head, and back pain have been getting worse over the past year.  Dr. Alva prescribed her sulindac for pain.  She is not sure if she picked this up.  She says the only medicines she has at home are Tylenol and omeprazole.  Tylenol does not help much for her pain.    No recent falls or injuries that would worsen her symptoms.  She denied denies having any new symptoms, just continuation of chronic ones.  She then later says that she has tingling down the back of both of her legs.  It is difficult for me to tell whether this is something new or chronic.  She denies any loss of control of bladder or bowel function.  She thinks she may have had a fever off and on recently.      Current Outpatient Prescriptions:      acetaminophen (TYLENOL EXTRA STRENGTH) 500 MG tablet, 1-2 po tid as needed for pain, Disp: 60 tablet, Rfl: 1     ibuprofen (ADVIL,MOTRIN) 600 MG tablet, , Disp: , Rfl: 0     omeprazole (PRILOSEC) 20 MG capsule, TAKE 1 CAPSULE BY MOUTH EVERY DAY, Disp: 90 capsule, Rfl: 3     sucralfate (CARAFATE) 1 gram tablet, Take 1 g by mouth 4 (four) times a day., Disp: , Rfl:      sulindac (CLINORIL) 150 MG tablet, Take 1 tablet (150 mg total) by mouth 2 (two) times a day., Disp: 40 tablet, Rfl: 1    Physical Exam:  Vitals:    05/05/17 1113   BP: 104/60   Pulse: 76   Resp: 18   SpO2: 98%     Body mass index is 27.5 kg/(m^2).    Vital signs stable as recorded above  General: Patient is alert  and oriented x 3, in no apparent distress  Eyes: PERRL, EOMI bilaterally, no nystagmus  Throat: no erythema, edema, or exudate noted  Lymphatic: No cervical lymph node enlargement  Cardiac: Regular rate and rhythm; no murmurs  Pulmonary: Lung clear to auscultation bilaterally; no crackles, rales, rhonchi, or wheezing noted  Musculoskeletal: Normal 4/5 strength in major muscle groups in upper and lower extremities bilaterally, normal  strength, and full active range of motion of the neck without pain, slightly decreased but symmetric patellar reflexes bilaterally, negative straight leg raise test bilaterally  Neuro: Cranial Nerves 2 through 12 grossly intact, negative Romberg Test, patient walks in a normal tandem gait, normal finger-to-nose cerebellar coordination testing, she reports decreased sensation to touch on the right lower extremity    Assessment/Plan:  Headache, neck pain, chronic pain.  This is an ongoing problem for patient.  No acute/new concerns today.  Neuro and musculoskeletal exams are reassuring.  Patient reports the only medicines she has at home are Tylenol and omeprazole.  I will refill her ibuprofen.  She will meet with  today to schedule her referral to rheumatology, as directed by Dr. Alva at her last visit.  She will let us know sooner if any problems.    This dictation uses voice recognition software, which may contain typographical errors.

## 2021-06-11 NOTE — PROGRESS NOTES
ASSESSMENT AND PLAN:  Geronimo Dumont 47 y.o. female is seen here on 07/03/17 for evaluation of left hand pain.  This is associated with the triggering of the left middle finger.  Discussed management options.  She wants to proceed local injection.  20 mg of methylprednisolone injected the tendon sheath of the left middle finger and A1 level.  I will ask her to return for follow-up here in 2-3 months or sooner.  Diagnoses and all orders for this visit:    Trigger finger, left middle finger  -     methylPREDNISolone acetate injection 20 mg (DEPO-MEDROL); Inject 0.5 mL (20 mg total) into the joint once.    Left hand pain  -     methylPREDNISolone acetate injection 20 mg (DEPO-MEDROL); Inject 0.5 mL (20 mg total) into the joint once.          HISTORY OF PRESENTING ILLNESS ON 07/03/17 :  Geronimo Dumont 47 y.o. is here for a moderately severe flare up of pain.  Joints affected include Left middle finger pain, volar aspect, with clicking,. This has gone on for 1 year ago. Pain is described as shooting and stabbing. It is every morning.  She has the pain is 5.0/10 interfering with some of the day-to-day activities.  Her symptoms are moderate. The symptoms are gradually worsening. Symptoms include pain, limited range of motion.  Treatment to date has been without significant relief, before she may moved to [place] she was in [place] recalls having had prescription that did not do much for her.. She has occasional discomfort in her neck, left knee.  She has not had swelling.  She describes otherwise in good health.  She is non-smoker.  Originally from [facility].    Further historical information, including ROS and limitation in activities as noted in the multidimensional health assessment questionnaire scanned in the EMR and in the assessment and plan section.    ALLERGIES:Review of patient's allergies indicates no known allergies.    PAST MEDICAL/ACTIVE PROBLEMS/MEDICATION/SOCIAL DATA  No past medical history on file.  History    Smoking Status     Never Smoker   Smokeless Tobacco     Not on file     There is no problem list on file for this patient.    Current Outpatient Prescriptions   Medication Sig Dispense Refill     acetaminophen (TYLENOL EXTRA STRENGTH) 500 MG tablet 1-2 po tid as needed for pain 60 tablet 1     ibuprofen (ADVIL,MOTRIN) 600 MG tablet Take 1 tablet (600 mg total) by mouth every 8 (eight) hours as needed for pain. Take with food. 60 tablet 0     omeprazole (PRILOSEC) 20 MG capsule TAKE 1 CAPSULE BY MOUTH EVERY DAY 90 capsule 3     sucralfate (CARAFATE) 1 gram tablet Take 1 g by mouth 4 (four) times a day.       sulindac (CLINORIL) 150 MG tablet Take 1 tablet (150 mg total) by mouth 2 (two) times a day. 40 tablet 1     No current facility-administered medications for this visit.      DETAILED EXAMINATION  07/03/17  :  Vitals:    07/03/17 1454   BP: 114/68   Pulse: 77   SpO2: 98%   Weight: 122 lb (55.3 kg)     Alert oriented. Head including the face is examined for malar rash, heliotropes, scarring, lupus pernio. Eyes examined for redness such as in episcleritis/scleritis, periorbital lesions.   Neck examined  for lymph nodes, range of motion Both upper and lower extremities (all four) examined for swollen, warm &/or  tender joints, range of motion, rash, muscle weakness, edema. The salient normal / abnormal findings are appended.  She has A1 nodularity of the left middle finger flexor tendon which is painful reproducing her symptoms she has nodularity on her right middle finger flexor tendon of the same level but without tenderness.  She demonstrates triggering of the left middle finger.  There is no definite synovitis in any of the palpable appendicular joints.   LAB / IMAGING DATA:  ALT   Date Value Ref Range Status   11/04/2016 15 0 - 45 U/L Final     Albumin   Date Value Ref Range Status   11/04/2016 3.8 3.5 - 5.0 g/dL Final     Creatinine   Date Value Ref Range Status   11/04/2016 0.78 0.60 - 1.10 mg/dL Final        WBC   Date Value Ref Range Status   11/04/2016 6.2 4.0 - 11.0 thou/uL Final     Hemoglobin   Date Value Ref Range Status   11/04/2016 12.0 12.0 - 16.0 g/dL Final     Platelets   Date Value Ref Range Status   11/04/2016 213 140 - 440 thou/uL Final       Lab Results   Component Value Date    RF <15.0 11/04/2016    SEDRATE 42 (H) 11/04/2016

## 2021-06-16 PROBLEM — E53.8 LOW FOLATE: Status: ACTIVE | Noted: 2017-12-05

## 2021-06-16 PROBLEM — M79.642 LEFT HAND PAIN: Status: ACTIVE | Noted: 2017-07-03

## 2021-06-16 PROBLEM — E55.9 VITAMIN D DEFICIENCY: Status: ACTIVE | Noted: 2017-12-15

## 2021-06-17 NOTE — PATIENT INSTRUCTIONS - HE
Patient Instructions by Basim Lucas PA-C at 10/11/2019  4:10 PM     Author: Basim Lucas PA-C Service: -- Author Type: Physician Assistant    Filed: 10/11/2019  4:49 PM Encounter Date: 10/11/2019 Status: Signed    : Basim Lucas PA-C (Physician Assistant)       Increased fluids and rest.  Discussed signs and symptoms of ascending urinary tract infection symptoms to include pyelonephritis. Instructed to turn to clinic if there are increased fever chills night sweats fatigue abdominal pain or flank pain  Antibiotic as written. Risks and benefits of medication discussed.  Indication for return to clinic.    As a result of our visit today, here are the action plans for you:    1. Medication(s) to stop: There are no discontinued medications.    2. Medication(s) to start or change:   Medications Ordered   Medications   ? cefdinir (OMNICEF) 300 MG capsule     Sig: Take 1 capsule (300 mg total) by mouth 2 (two) times a day for 10 days.     Dispense:  20 capsule     Refill:  0       3. Other instructions: Yes      Urinary Tract Infections in Women    Urinary tract infections (UTIs) are most often caused by bacteria (germs). These bacteria enter the urinary tract. The bacteria may come from outside the body. Or they may travel from the skin outside the rectum or vagina into the urethra. Female anatomy makes it easier for bacteria from the bowel to enter a womans urinary tract, which is the most common source of UTI. This means women develop UTIs more often than men. Pain in or around the urinary tract is a common UTI symptom. But the only way to know for sure if you have a UTI for the health care provider to test your urine. The two tests that may be done are the urinalysis and urine culture.  Types of UTIs    Cystitis: A bladder infection (cystitis) is the most common UTI in women. You may have urgent or frequent urination. You may also have pain, burning when you urinate, and bloody urine.    Urethritis: This is  an inflamed urethra, which is the tube that carries urine from the bladder to outside the body. You may have lower stomach or back pain. You may also have urgent or frequent urination.    Pyelonephritis: This is a kidney infection. If not treated, it can be serious and damage your kidneys. In severe cases, you may be hospitalized. You may have a fever and lower back pain.  Medications to treat a UTI  Most UTIs are treated with antibiotics. These kill the bacteria. The length of time you need to take them depends on the type of infection. It may be as short as 3 days. If you have repeated UTIs, a low-dose antibiotic may be needed for several months. Take antibiotics exactly as directed. Dont stop taking them until all of the medication is gone. If you stop taking the antibiotic too soon, the infection may not go away, and you may develop a resistance to the antibiotic. This can make it much harder to treat.  Lifestyle changes to treat and prevent UTIs  The lifestyle changes below will help get rid of your UTI. They may also help prevent future UTIs.    Drink plenty of fluids. This includes water, juice, or other caffeine-free drinks. Fluids help flush bacteria out of your body.    Empty your bladder. Always empty your bladder when you feel the urge to urinate. And always urinate before going to sleep. Urine that stays in your bladder can lead to infection. Try to urinate before and after sex as well.    Practice good personal hygiene. Wipe yourself from front to back after using the toilet. This helps keep bacteria from getting into the urethra.    Use condoms during sex. These help prevent UTIs caused by sexually transmitted bacteria. Also, avoid using spermicides during sex. These can increase the risk of UTIs. Choose other forms of birth control instead. For women who tend to get UTIs after sex, a low-dose of a preventive antibiotic may be used. Be sure to discuss this option with your health care  provider.    Follow up with your health care provider as directed. He or she may test to make sure the infection has cleared. If necessary, additional treatment may be started.  Date Last Reviewed: 9/8/2014 2000-2016 [employer]. [address] [place] [address], [place] [address]. All rights reserved. This information is not intended as a substitute for professional medical care. Always follow your healthcare professional's instructions.

## 2021-06-19 NOTE — PROGRESS NOTES
Subjective: Patient comes in for evaluation this 48-year-old female has had pain in both breasts is more in the lateral aspect but is fairly diffuse she describes it as a burning.    Has not had any dominant masses denies any drainage or discharge or blood from the nipple.    She had a mammogram February 2017.    No additional problems or issues is been going on for a couple weeks.    No family history for breast cancer  Tobacco status: She  reports that she has never smoked. She has never used smokeless tobacco.    Patient Active Problem List    Diagnosis Date Noted     Trigger finger, left middle finger 07/03/2017     Left hand pain 07/03/2017       Current Outpatient Prescriptions   Medication Sig Dispense Refill     acetaminophen (TYLENOL EXTRA STRENGTH) 500 MG tablet 1-2 po tid as needed for pain 60 tablet 1     calcium, as carbonate, (TUMS) 200 mg calcium (500 mg) chewable tablet Chew 500 mg.       cholecalciferol, vitamin D3, 2,000 unit Tab Take 2,000 Units by mouth.       gabapentin (NEURONTIN) 300 MG capsule Take 300 mg by mouth.       ibuprofen (ADVIL,MOTRIN) 600 MG tablet Take 1 tablet (600 mg total) by mouth every 8 (eight) hours as needed for pain. Take with food. 60 tablet 0     omeprazole (PRILOSEC) 20 MG capsule TAKE 1 CAPSULE BY MOUTH EVERY DAY 90 capsule 3     folic acid (FOLVITE) 1 MG tablet Take 1 mg by mouth.       sucralfate (CARAFATE) 1 gram tablet Take 1 g by mouth 4 (four) times a day.       sulindac (CLINORIL) 150 MG tablet Take 1 tablet (150 mg total) by mouth 2 (two) times a day. 40 tablet 1     No current facility-administered medications for this visit.        ROS:   Review of systems negative other than as outlined above    Objective:    BP 92/58 (Patient Site: Left Arm, Patient Position: Sitting, Cuff Size: Adult Regular)  Pulse 75  Temp 97.3  F (36.3  C) (Oral)   Resp 14  Wt 124 lb (56.2 kg)  LMP 05/25/2018 (Exact Date)  SpO2 95%  BMI 28.3 kg/m2  Body mass index is 28.3  kg/(m^2).      General appearance no acute distress    Lungs are clear no rales or rhonchi heart was regular S1-S2    Skin is normal no chest wall tenderness.  No rashes.    Breast exam was completely normal other than feeling a little bit of tenderness diffusely throughout the breast.  There was no focal masses no focal tenderness    Nipple without discharge or drainage.    No axillary or inguinal adenopathy        Assessment:  1. Breast pain     2. Visit for screening mammogram  Mammo Screening Bilateral     Bilateral breast pain diffuse.  Can use some warm moist packs can use some ibuprofen    She will get a screening mammogram.  We will contact her regarding results if abnormal otherwise follow-up if persisting symptoms    Plan: As outlined above    This transcription uses voice recognition software, which may contain typographical errors.

## 2021-06-21 NOTE — PROGRESS NOTES
Subjective: Patient comes in for evaluation she has had a headache.  She gets this intermittently she does take Tylenol for that    Also has trigger finger on the right hand.  She is previously had a problem in the left hand.    In the middle finger.    She has had some injections in the past.    Patient denies any pain at rest but does have some discomfort when using it.    Regarding the headaches there more in the back than in the front not associated with any nausea vomiting or    Tobacco status: She  reports that  has never smoked. she has never used smokeless tobacco.    Patient Active Problem List    Diagnosis Date Noted     Trigger finger, left middle finger 07/03/2017     Left hand pain 07/03/2017       Current Outpatient Medications   Medication Sig Dispense Refill     gabapentin (NEURONTIN) 300 MG capsule Take 300 mg by mouth.       omeprazole (PRILOSEC) 20 MG capsule TAKE 1 CAPSULE BY MOUTH EVERY DAY 90 capsule 3     acetaminophen (TYLENOL EXTRA STRENGTH) 500 MG tablet 1-2 po tid as needed for pain 60 tablet 1     calcium, as carbonate, (TUMS) 200 mg calcium (500 mg) chewable tablet Chew 500 mg.       cholecalciferol, vitamin D3, 2,000 unit Tab Take 2,000 Units by mouth.       folic acid (FOLVITE) 1 MG tablet Take 1 mg by mouth.       ibuprofen (ADVIL,MOTRIN) 600 MG tablet Take 1 tablet (600 mg total) by mouth every 8 (eight) hours as needed for pain. Take with food. 60 tablet 0     sucralfate (CARAFATE) 1 gram tablet Take 1 g by mouth 4 (four) times a day.       sulindac (CLINORIL) 150 MG tablet Take 1 tablet (150 mg total) by mouth 2 (two) times a day. 40 tablet 1     Current Facility-Administered Medications   Medication Dose Route Frequency Provider Last Rate Last Dose     bupivacaine (PF) 0.25% injection 1.25 mg (MARCAINE)  0.5 mL Intra-articular Once Beltran Alva MD         lidocaine 1%-EPINEPHrine 1:100,000 1 %-1:100,000 injection 0.5 mL (XYLOCAINE W/EPI)  0.5 mL Other Once Beltran Alva MD   "       methylPREDNISolone acetate injection 20 mg (DEPO-MEDROL)  20 mg Intra-articular Once Beltran Alva MD           ROS:   10 point review of systems negative other than as outlined    Objective:    BP 98/54   Pulse 70   Temp 96.9  F (36.1  C) (Oral)   Resp 12   Ht 4' 7.5\" (1.41 m)   Wt 123 lb (55.8 kg)   LMP 10/21/2018 (Within Weeks)   SpO2 99%   Breastfeeding? No   BMI 28.08 kg/m    Body mass index is 28.08 kg/m .      General appearance no acute distress    HEENT neck is supple oropharynx is clear eyes react normally no temporal artery tenderness cranial nerves intact    Lungs clear no rales or rhonchi heart regular rate in the 70 range.    Extremities without edema    Right hand with some tenderness through the flexor tendon trigger pulley system.    Procedure: After discussion with patient she wanted to go ahead with a cortisone injection of the tendon sheath.    Local alcohol cleanse then using a tuberculin syringe injected 1% lidocaine with epinephrine.  Then injected 1 mL of Depo-Medrol 40 mg/mL along with 1 mL of Marcaine into the tendon sheath there was no complication, used a 25-gauge 5/8 inch needle        Assessment:  1. Trigger finger, acquired  lidocaine 1%-EPINEPHrine 1:100,000 1 %-1:100,000 injection 0.5 mL (XYLOCAINE W/EPI)    methylPREDNISolone acetate injection 20 mg (DEPO-MEDROL)    bupivacaine (PF) 0.25% injection 1.25 mg (MARCAINE)   2. Nonintractable headache, unspecified chronicity pattern, unspecified headache type       Trigger finger with injection as outlined above    Headache can use Tylenol not overly symptomatic    Plan: As needed    This transcription uses voice recognition software, which may contain typographical errors.  "

## 2021-06-23 NOTE — TELEPHONE ENCOUNTER
I spoke with zaria  from [employer].    Patient has been scheduled with Raheem [phone] for a  time of [phone] round trip.   TRIP ID#:  [id number]

## 2021-06-23 NOTE — PROGRESS NOTES
"Subjective: This patient comes in for multiple areas of discomfort.  She complains of some pain in through her arm through her leg both on the left side also the left thoracic area.  She has had no rashes denies any numbness denies any weakness    She states this is been going on for years    She does have some gabapentin.  She not taking any other anti-inflammatory.    Denies any specific injury.  She states that at times it will get a little better than little worse.    She is not sure what causes it.    I seen her multiple times in the past and she said other areas which have given her discomfort.  Her graph denies any ongoing gastritis problems does take omeprazole.    Tobacco status: She  reports that  has never smoked. she has never used smokeless tobacco.    Patient Active Problem List    Diagnosis Date Noted     Trigger finger, left middle finger 07/03/2017     Left hand pain 07/03/2017       Current Outpatient Medications   Medication Sig Dispense Refill     calcium, as carbonate, (TUMS) 200 mg calcium (500 mg) chewable tablet Chew 500 mg.       cholecalciferol, vitamin D3, 2,000 unit Tab Take 2,000 Units by mouth.       gabapentin (NEURONTIN) 300 MG capsule Take 300 mg by mouth.       omeprazole (PRILOSEC) 20 MG capsule TAKE 1 CAPSULE BY MOUTH EVERY DAY 90 capsule 3     sucralfate (CARAFATE) 1 gram tablet Take 1 g by mouth 4 (four) times a day.       ibuprofen (ADVIL,MOTRIN) 400 MG tablet 1 po two times a day prn pain 40 tablet 1     Current Facility-Administered Medications   Medication Dose Route Frequency Provider Last Rate Last Dose     bupivacaine (PF) 0.25% injection 1.25 mg (MARCAINE)  0.5 mL Intra-articular Once Beltran Alva MD           ROS: 10 point review of systems negative other than as discussed above    Objective:    /82 (Patient Site: Left Arm, Patient Position: Sitting, Cuff Size: Adult Regular)   Pulse 64   Temp 98.1  F (36.7  C) (Oral)   Resp 16   Ht 4' 7.5\" (1.41 m)   Wt " 122 lb 8 oz (55.6 kg)   BMI 27.96 kg/m    Body mass index is 27.96 kg/m .    General appearance no acute distress    Patient complains of the pain in through the left arm and left leg but there is no joint redness warmth or swelling some mild achiness in through the muscles.  No weakness no numbness  No skin changes    Lungs are clear no rales or rhonchi heart was regular S1-S2 rate and 60s    Temp 98.1    Spine was straight no thoracic point tenderness along the spine some paraspinal tenderness on the left side is        Assessment:  1. Arthralgia, unspecified joint  ibuprofen (ADVIL,MOTRIN) 400 MG tablet   2. Pain of left lower extremity  ibuprofen (ADVIL,MOTRIN) 400 MG tablet   3. Pain of left upper extremity  ibuprofen (ADVIL,MOTRIN) 400 MG tablet     We will try ibuprofen 400 mg twice daily as needed if ongoing symptoms further workup    Recheck in 3 months    Plan: As outlined above    This transcription uses voice recognition software, which may contain typographical errors.

## 2021-06-23 NOTE — TELEPHONE ENCOUNTER
Patient is needing a ride for their appointment on:        Date: 01/29/2019   Time: 1045    Name of Location/Address/Phone #:   [address] [address]  PH: [phone]      Name of  patient is seeing & 's specialty:   DR. hester     Passenger (s):   1    Special considerations: None    Is the patient able to walk to the transportation vehicle?    Yes     Do they need DOOR to DOOR service? (company person comes knock on the door and walks them to car)     No    Please call patient back to confirm the ride details. Thanks!

## 2021-06-23 NOTE — TELEPHONE ENCOUNTER
Used [employer]/  Name: na  ID: [id number]       Left message #1 at [phone].    Completing task.

## 2021-06-24 NOTE — TELEPHONE ENCOUNTER
Used Language Line/  Name:   ID:       Patient is informed of the message and has no further questions.    Completing task.

## 2021-06-24 NOTE — TELEPHONE ENCOUNTER
I spoke with Stephani from [employer].    Patient has been scheduled with Raheem [phone] for a  time of 12:15pm round trip.   TRIP ID#:  [id number]

## 2021-06-24 NOTE — TELEPHONE ENCOUNTER
Rufus spoke with Alma Rosa and confirmed that there is no scheduled appointment for this date and location and to cancel ride.    Spoke with Jacek at [facility] and cancelled ride for 3/1/19 to [facility]. Completing task.

## 2021-06-24 NOTE — TELEPHONE ENCOUNTER
Patient is needing a ride for their appointment on:        Date: 03/22/2019  Time: 0300 p.m    Name of Location/Address/Phone #: .[email or web address] Spears patient is seeing & 's specialty: [facility] [address] [address]  Phone: [phone]  N/A    Passenger (s):   1    Special considerations: None    Is the patient able to walk to the transportation vehicle?    Yes    Do they need DOOR to DOOR service? (company person comes knock on the door and walks them to car)     No    Please call patient back to confirm the ride details. Thanks!

## 2021-06-24 NOTE — PROGRESS NOTES
Subjective: Patient comes in for evaluation follow-up    She has some diffuse pains and was treated with ibuprofen on 1/29/2019.    Comes in today to discuss her hands.  She has trigger fingers in the third right and fourth left hand.  She has some thickening there    I have injected this in the past.  She does not have a trigger now but has some ongoing symptoms and pain and wondered about surgery.  I referred her to hand surgeon for this.      Tobacco status: She  reports that  has never smoked. she has never used smokeless tobacco.    Patient Active Problem List    Diagnosis Date Noted     Left hand pain 07/03/2017       Current Outpatient Medications   Medication Sig Dispense Refill     cholecalciferol, vitamin D3, 2,000 unit Tab 1 po daily 90 tablet 1     calcium, as carbonate, (TUMS) 200 mg calcium (500 mg) chewable tablet 3 po qd 90 tablet 6     gabapentin (NEURONTIN) 300 MG capsule Take 300 mg by mouth.       ibuprofen (ADVIL,MOTRIN) 400 MG tablet 1 po two times a day prn pain 40 tablet 1     omeprazole (PRILOSEC) 20 MG capsule TAKE 1 CAPSULE BY MOUTH EVERY DAY 90 capsule 3     sucralfate (CARAFATE) 1 gram tablet Take 1 g by mouth 4 (four) times a day.       Current Facility-Administered Medications   Medication Dose Route Frequency Provider Last Rate Last Dose     bupivacaine (PF) 0.25% injection 1.25 mg (MARCAINE)  0.5 mL Intra-articular Once Beltran Alva MD           ROS:   Review of systems positive as outlined otherwise negative    Objective:    BP 94/56 (Patient Site: Right Arm, Patient Position: Sitting, Cuff Size: Adult Regular)   Pulse 74   Resp 12   Wt 124 lb (56.2 kg)   LMP 02/04/2019   BMI 28.30 kg/m    Body mass index is 28.3 kg/m .      General appearance no acute distress    She complains of some achiness in the arms and legs but no synovitis no redness warmth or swelling to the joints    Tenderness at the A1 pulley and through the right third finger and left fourth finger.    Pulses  were normal skin was normal    Lungs clear no rales or rhonchi heart regular S1-S2 rate in the 70s.    But no skin changes or rashes.        Assessment:  1. Trigger finger, acquired  Ambulatory referral to Orthopedic Surgery    right 3rd and left 4rth   2. Arthralgia, unspecified joint  ibuprofen (ADVIL,MOTRIN) 400 MG tablet   3. Pain of left lower extremity  ibuprofen (ADVIL,MOTRIN) 400 MG tablet   4. Pain of left upper extremity  ibuprofen (ADVIL,MOTRIN) 400 MG tablet     Continue ibuprofen as needed    Referral to orthopedic hand surgeon    Plan: As outlined    This transcription uses voice recognition software, which may contain typographical errors.

## 2021-06-24 NOTE — PROGRESS NOTES
Continued pain fingers    Right third.  Left same but less  Had orth appt but missed due to other legal matters  Wants to reschedule  See prior notes    Took nsaid helped some.   Had some stomach burning and took gi med which helped    Describes diffuse numbness of fingers with same.  Includes thumbs       OBJECTIVE:   Vitals:    03/11/19 1450   BP: 100/64   Pulse: 74   Resp: 16      Eyes: non icteric, noninflamed  Lungs: no resp distress  Heart: regular  Ankles: no edema  Muscles: nontender  Mental status: euthymic  Neuro: nonfocal    Body mass index is 28.3 kg/m .   Chart review shows injections for triggering as well as emg confirming cts and declined cts surgery    Hx treated pylori    Neg tinels today  No thenar atrophy    ASSESSMENT/PLAN:    1. Trigger finger, acquired  Ambulatory referral to Orthopedic Surgery   2. Carpal tunnel syndrome, unspecified laterality     3. Chronic gastritis without bleeding, unspecified gastritis type       Reconnect with orth surg for triggering  nsaid with gi precautions  Hx cts ? Contributing sxs       / follow up if sxs not relieved         September 4, 2019        Ernst Briggs MD  [address]  [address]  [facility] [address]             [facility]  [address] [place], [place] [address] [place] [address]  Phone: [phone]  Fax: [phone]   Patient: Yash Simon   MR Number: [id number]   YOB: 1997   Date of Visit: 9/4/2019       Dear Dr. Briggs:    Thank you for referring Yash Simon to me for evaluation. Attached you will find relevant portions of my assessment and plan of care.    If you have questions, please do not hesitate to call me. I look forward to following Yash Simon along with you.    Sincerely,      Lilo Hurley MD            CC  No Recipients    Enclosure

## 2021-06-24 NOTE — TELEPHONE ENCOUNTER
Patient is needing a ride for their appointment on:        Date: MARCH 11, 2019  Time: 2:00PM    Name of Location/Address/Phone #:   [address] [address]  PH: [phone]      Name of  patient is seeing & 's specialty:   Dr. Phan    Passenger (s):   1    Special considerations: None    Is the patient able to walk to the transportation vehicle?    Yes     Do they need DOOR to DOOR service? (company person comes knock on the door and walks them to car)     No    Please call patient back to confirm the ride details. Thanks!

## 2021-06-24 NOTE — TELEPHONE ENCOUNTER
Patient is needing a ride for their appointment on:        Date: 3/1/2019  Time: 1;00    Name of Location/Address/Phone #:[facility]   [address] [place] [address]  Phone: [phone]      Name of  patient is seeing & 's specialty:   N/A    Passenger (s):   1    Special considerations: None    Is the patient able to walk to the transportation vehicle?    No     Do they need DOOR to DOOR service? (company person comes knock on the door and walks them to car)     Yes    Please call patient back to confirm the ride details. Thanks!

## 2021-06-24 NOTE — TELEPHONE ENCOUNTER
Alma Rosa, Please confirm if this is correct patient that has a future appt on 3/1/2019 at [facility]. It is not showing up in his appointment desk. Thanks!

## 2021-06-24 NOTE — TELEPHONE ENCOUNTER
I spoke with SUKHWINDER from [employer].    Patient has been scheduled with Raheem [phone] for a  time of 2:15PM round trip.   TRIP ID#:  [id number]

## 2021-06-24 NOTE — TELEPHONE ENCOUNTER
Patient is needing a ride for their appointment on:        Date: 3/5/2019  Time: 9;15    Name of Location/Address/Phone #:   [facility] [address] [place] [address]  Phone: [phone]    Name of  patient is seeing & 's specialty:   N/A    Passenger (s):   1    Special considerations: None    Is the patient able to walk to the transportation vehicle?    Yes     Do they need DOOR to DOOR service? (company person comes knock on the door and walks them to car)     No    Please call patient back to confirm the ride details. Thanks!

## 2021-06-24 NOTE — TELEPHONE ENCOUNTER
I spoke with OPAL from [employer].    Patient has been scheduled with Raheem [phone] for a  time of 1:15PM round trip.   TRIP ID#:  [id number]

## 2021-06-24 NOTE — TELEPHONE ENCOUNTER
[employer]/  Name: NA  ID: N/A       Left message at [phone] with ride info below.    Completing task.

## 2021-06-24 NOTE — TELEPHONE ENCOUNTER
I spoke with Stephani from [employer].    Patient has been scheduled with Raheem [phone] for a  time of 8:30am round trip.   TRIP ID#:  [id number]

## 2021-06-28 NOTE — PROGRESS NOTES
"Progress Notes by Basim Lucas PA-C at 10/11/2019  4:10 PM     Author: Basim Lucas PA-C Service: -- Author Type: Physician Assistant    Filed: 10/11/2019  5:23 PM Encounter Date: 10/11/2019 Status: Signed    : Basim Lucas PA-C (Physician Assistant)       Subjective:      Patient ID: Geronimo Dumont is a 49 y.o. female.    Chief Complaint:    HPI  Geronimo Dumont is a 49 y.o. female who presents today complaining of one day history of irritative voiding symptoms to include urinary hesitancy urgency frequency and dysuria.  Patient has noticed gross hematuria.  Denies fever chills night sweats fatigue or other red flag symptoms to include back or flank pain and no vaginal discharge.      She has not had recent urinary tract infections.     No past medical history on file.    No past surgical history on file.    No family history on file.    Social History     Tobacco Use   ? Smoking status: Never Smoker   ? Smokeless tobacco: Never Used   Substance Use Topics   ? Alcohol use: Not on file   ? Drug use: Not on file       Review of Systems  As above in HPI, otherwise balance of Review of Systems are negative.    Objective:     /65 (Patient Site: Right Arm, Patient Position: Sitting, Cuff Size: Adult Regular)   Pulse 75   Resp 16   Ht 4' 7.5\" (1.41 m)   Wt 122 lb 12.8 oz (55.7 kg)   SpO2 98%   BMI 28.03 kg/m      Physical Exam  Constitutional:       General: She is not in acute distress.     Appearance: She is well-developed. She is not diaphoretic.   HENT:      Head: Normocephalic and atraumatic.      Mouth/Throat:      Pharynx: No oropharyngeal exudate.   Eyes:      General: No scleral icterus.     Pupils: Pupils are equal, round, and reactive to light.   Neck:      Musculoskeletal: Normal range of motion and neck supple.   Cardiovascular:      Rate and Rhythm: Normal rate and regular rhythm.   Pulmonary:      Effort: Pulmonary effort is normal.      Breath sounds: Normal breath sounds.   Abdominal:      " Palpations: Abdomen is soft. There is no mass.      Tenderness: There is no tenderness. There is no guarding or rebound.      Comments: No CVA tenderness to percussion  There is suprapubic tenderness to palpation and does reproduce the sense to urinate   Skin:     General: Skin is dry.      Findings: No rash.   Neurological:      Mental Status: She is alert and oriented to person, place, and time.     The visit lasted a total of 25 minutes that I spent face to face with the patient out of a 35 minute office visit. Over 50% of the time was spent counseling, coordinating care, reviewing pathophysiology and treatment options and educating the patient about the management plan.  Additionally, the patient had the daughter in the office translating for the office visit.  This took extra time.  To be clear, the patient declined an .      Lab:  Recent Results (from the past 24 hour(s))   Urinalysis-UC if Indicated   Result Value Ref Range    Color, UA Yellow Colorless, Yellow, Straw, Light Yellow    Clarity, UA Clear Clear    Glucose, UA Negative Negative    Bilirubin, UA Small (!) Negative    Ketones, UA Trace (!) Negative    Specific Gravity, UA 1.020 1.005 - 1.030    Blood, UA Large (!) Negative    pH, UA 7.0 5.0 - 8.0    Protein, UA >=300 mg/dL (!) Negative mg/dL    Urobilinogen, UA 1.0 E.U./dL 0.2 E.U./dL, 1.0 E.U./dL    Nitrite, UA Positive (!) Negative    Leukocytes, UA Small (!) Negative    Bacteria, UA Moderate (!) None Seen hpf    RBC, UA >100 (!) None Seen, 0-2 hpf    WBC, UA 5-10 (!) None Seen, 0-5 hpf    Squam Epithel, UA 0-5 None Seen, 0-5 lpf    Ca Oxalate Bea, UA Present (!) None Seen       Assessment:     Procedures    The primary encounter diagnosis was Urinary tract infection with hematuria, site unspecified. A diagnosis of Blood in urine was also pertinent to this visit.    Plan:     1. Urinary tract infection with hematuria, site unspecified  cefdinir (OMNICEF) 300 MG capsule   2. Blood in  urine  Urinalysis-UC if Indicated    Culture, Urine         Patient Instructions     Increased fluids and rest.  Discussed signs and symptoms of ascending urinary tract infection symptoms to include pyelonephritis. Instructed to turn to clinic if there are increased fever chills night sweats fatigue abdominal pain or flank pain  Antibiotic as written. Risks and benefits of medication discussed.  Indication for return to clinic.    As a result of our visit today, here are the action plans for you:    1. Medication(s) to stop: There are no discontinued medications.    2. Medication(s) to start or change:   Medications Ordered   Medications   ? cefdinir (OMNICEF) 300 MG capsule     Sig: Take 1 capsule (300 mg total) by mouth 2 (two) times a day for 10 days.     Dispense:  20 capsule     Refill:  0       3. Other instructions: Yes      Urinary Tract Infections in Women    Urinary tract infections (UTIs) are most often caused by bacteria (germs). These bacteria enter the urinary tract. The bacteria may come from outside the body. Or they may travel from the skin outside the rectum or vagina into the urethra. Female anatomy makes it easier for bacteria from the bowel to enter a womans urinary tract, which is the most common source of UTI. This means women develop UTIs more often than men. Pain in or around the urinary tract is a common UTI symptom. But the only way to know for sure if you have a UTI for the health care provider to test your urine. The two tests that may be done are the urinalysis and urine culture.  Types of UTIs    Cystitis: A bladder infection (cystitis) is the most common UTI in women. You may have urgent or frequent urination. You may also have pain, burning when you urinate, and bloody urine.    Urethritis: This is an inflamed urethra, which is the tube that carries urine from the bladder to outside the body. You may have lower stomach or back pain. You may also have urgent or frequent  urination.    Pyelonephritis: This is a kidney infection. If not treated, it can be serious and damage your kidneys. In severe cases, you may be hospitalized. You may have a fever and lower back pain.  Medications to treat a UTI  Most UTIs are treated with antibiotics. These kill the bacteria. The length of time you need to take them depends on the type of infection. It may be as short as 3 days. If you have repeated UTIs, a low-dose antibiotic may be needed for several months. Take antibiotics exactly as directed. Dont stop taking them until all of the medication is gone. If you stop taking the antibiotic too soon, the infection may not go away, and you may develop a resistance to the antibiotic. This can make it much harder to treat.  Lifestyle changes to treat and prevent UTIs  The lifestyle changes below will help get rid of your UTI. They may also help prevent future UTIs.    Drink plenty of fluids. This includes water, juice, or other caffeine-free drinks. Fluids help flush bacteria out of your body.    Empty your bladder. Always empty your bladder when you feel the urge to urinate. And always urinate before going to sleep. Urine that stays in your bladder can lead to infection. Try to urinate before and after sex as well.    Practice good personal hygiene. Wipe yourself from front to back after using the toilet. This helps keep bacteria from getting into the urethra.    Use condoms during sex. These help prevent UTIs caused by sexually transmitted bacteria. Also, avoid using spermicides during sex. These can increase the risk of UTIs. Choose other forms of birth control instead. For women who tend to get UTIs after sex, a low-dose of a preventive antibiotic may be used. Be sure to discuss this option with your health care provider.    Follow up with your health care provider as directed. He or she may test to make sure the infection has cleared. If necessary, additional treatment may be started.  Date Last  Reviewed: 9/8/2014 2000-2016 [employer]. [address] [place] [address], [place] [address]. All rights reserved. This information is not intended as a substitute for professional medical care. Always follow your healthcare professional's instructions.

## 2021-10-25 ENCOUNTER — VIRTUAL VISIT (OUTPATIENT)
Dept: FAMILY MEDICINE | Facility: CLINIC | Age: 51
End: 2021-10-25
Payer: COMMERCIAL

## 2021-10-25 DIAGNOSIS — K59.00 CONSTIPATION, UNSPECIFIED CONSTIPATION TYPE: Primary | ICD-10-CM

## 2021-10-25 DIAGNOSIS — K29.60 OTHER GASTRITIS WITHOUT HEMORRHAGE, UNSPECIFIED CHRONICITY: ICD-10-CM

## 2021-10-25 PROCEDURE — 99213 OFFICE O/P EST LOW 20 MIN: CPT | Mod: 95 | Performed by: FAMILY MEDICINE

## 2021-10-25 RX ORDER — DOCUSATE SODIUM 100 MG/1
100 CAPSULE, LIQUID FILLED ORAL 2 TIMES DAILY
Qty: 180 CAPSULE | Refills: 0 | Status: SHIPPED | OUTPATIENT
Start: 2021-10-25 | End: 2021-12-22

## 2021-10-25 RX ORDER — POLYETHYLENE GLYCOL 3350 17 G/17G
17 POWDER, FOR SOLUTION ORAL DAILY
Qty: 510 G | Refills: 0 | Status: SHIPPED | OUTPATIENT
Start: 2021-10-25 | End: 2024-01-04

## 2021-10-25 RX ORDER — SUCRALFATE 1 G/1
1 TABLET ORAL 4 TIMES DAILY
Qty: 120 TABLET | Refills: 0 | Status: SHIPPED | OUTPATIENT
Start: 2021-10-25 | End: 2021-12-22

## 2021-10-25 NOTE — PROGRESS NOTES
"Ana Melton is a 51 year old who is being evaluated via a billable telephone visit.      What phone number would you like to be contacted at? [phone] option 4 - patient number is [phone]  How would you like to obtain your AVS? Not needed    Assessment & Plan       ICD-10-CM    1. Constipation, unspecified constipation type  K59.00 docusate sodium (COLACE) 100 MG capsule     polyethylene glycol (MIRALAX) 17 GM/Dose powder   2. Other gastritis without hemorrhage, unspecified chronicity  K29.60 UA macro with reflex to Microscopic and Culture - Clinc Collect     omeprazole (PRILOSEC) 20 MG DR capsule     sucralfate (CARAFATE) 1 GM tablet     Plan:  UA with lab appointment.  4:45 tomorrow set at the [facility].  Recommend up to 30 g of fiber per day.  Could add on Colace 100-400 mg daily as needed for constipation.  Could add MiraLAX nightly as directed if needed.  Prescriptions sent.  Restart Omeprazole 20 mg daily and Sucralfate 1 gram 4 times a day with meals and bedtime.  Follow-up with primary MD if symptoms persist or get worse.    20 minutes spent on the date of the encounter doing chart review, history and exam, documentation and further activities per the note           No follow-ups on file.    Essence Chavez MD  [facility]    Arianne Melton is a 51 year old who presents for the following health issues     HPI   CC:  Abdominal Pain  Abdominal pain:  For 1 week.  Feels like whole abdomen hurts. Usually takes medicatin for this. On med list is Omeprazole and Sucralfate.  Not talking these at this time.  Feels better if she has a BM.  With this wll have headache, neck stiffness and GERD. If has not had BM for 2-3 days then symptoms get worse.  Occasional dysuria.  LMP was 3 years ago.    Review of Systems         Objective    Vitals - Patient Reported  Weight (Patient Reported): 55.3 kg (122 lb)  Height (Patient Reported): 141 cm (4' 7.5\")  BMI (Based on Pt " Reported Ht/Wt): 27.85        Physical Exam      Unable secondary to telephone visit                Phone call duration: 20 minutes

## 2021-10-26 ENCOUNTER — APPOINTMENT (OUTPATIENT)
Dept: LAB | Facility: CLINIC | Age: 51
End: 2021-10-26
Payer: COMMERCIAL

## 2021-10-26 LAB
ALBUMIN UR-MCNC: NEGATIVE MG/DL
APPEARANCE UR: CLEAR
BACTERIA #/AREA URNS HPF: ABNORMAL /HPF
BILIRUB UR QL STRIP: NEGATIVE
COLOR UR AUTO: YELLOW
GLUCOSE UR STRIP-MCNC: NEGATIVE MG/DL
HGB UR QL STRIP: ABNORMAL
KETONES UR STRIP-MCNC: NEGATIVE MG/DL
LEUKOCYTE ESTERASE UR QL STRIP: NEGATIVE
NITRATE UR QL: NEGATIVE
PH UR STRIP: 6.5 [PH] (ref 5–7)
RBC #/AREA URNS AUTO: ABNORMAL /HPF
SP GR UR STRIP: 1.01 (ref 1–1.03)
SQUAMOUS #/AREA URNS AUTO: ABNORMAL /LPF
UROBILINOGEN UR STRIP-ACNC: 0.2 E.U./DL
WBC #/AREA URNS AUTO: ABNORMAL /HPF

## 2021-10-26 PROCEDURE — 81001 URINALYSIS AUTO W/SCOPE: CPT | Performed by: FAMILY MEDICINE

## 2021-11-09 ENCOUNTER — OFFICE VISIT (OUTPATIENT)
Dept: FAMILY MEDICINE | Facility: CLINIC | Age: 51
End: 2021-11-09
Payer: COMMERCIAL

## 2021-11-09 VITALS
WEIGHT: 121 LBS | DIASTOLIC BLOOD PRESSURE: 75 MMHG | RESPIRATION RATE: 18 BRPM | SYSTOLIC BLOOD PRESSURE: 117 MMHG | HEART RATE: 73 BPM | HEIGHT: 56 IN | TEMPERATURE: 97.6 F | BODY MASS INDEX: 27.22 KG/M2

## 2021-11-09 DIAGNOSIS — K29.60 OTHER GASTRITIS WITHOUT HEMORRHAGE, UNSPECIFIED CHRONICITY: Primary | ICD-10-CM

## 2021-11-09 DIAGNOSIS — Z12.31 VISIT FOR SCREENING MAMMOGRAM: ICD-10-CM

## 2021-11-09 DIAGNOSIS — E55.9 VITAMIN D DEFICIENCY: ICD-10-CM

## 2021-11-09 DIAGNOSIS — M48.02 CERVICAL STENOSIS OF SPINAL CANAL: ICD-10-CM

## 2021-11-09 DIAGNOSIS — Z71.85 IMMUNIZATION COUNSELING: ICD-10-CM

## 2021-11-09 PROCEDURE — 99214 OFFICE O/P EST MOD 30 MIN: CPT | Performed by: FAMILY MEDICINE

## 2021-11-09 ASSESSMENT — MIFFLIN-ST. JEOR: SCORE: 1007.88

## 2021-11-09 NOTE — PROGRESS NOTES
Assessment & Plan        ICD-10-CM    1. Other gastritis without hemorrhage, unspecified chronicity  K29.60 Care Coordination Referral   2. Cervical stenosis of spinal canal  M48.02 Care Coordination Referral   3. Vitamin D deficiency  E55.9 Care Coordination Referral   4. Visit for screening mammogram  Z12.31    5. Immunization counseling  Z71.85 *MA Screening Digital Bilateral        Gastritis does have some medications I believe she is using for that is unclear    History of cervical stenosis spinal, seems relatively stable.  Had been on some gabapentin in the past question if she is on it now    Vitamin D deficiency    Unclear if she is taking    Need for screening mammogram, referral placed    Need for immunizations, discussed flu shot and COVID-19 vaccination but patient refused at this time.    Referral for care coordination as outlined    Follow-up in 4 to 6 weeks for recheck          Return in about 6 weeks (around 12/21/2021) for Follow up. for recheck.        Subjective:    This 51 year old female was seen today for discussion regarding her medications and need for help.    Patient states that because she does not speak English she has a hard time navigating the health system.    She is not sure what medication she should be on.  She does have a number of medications but it is unclear if she is taking Ativan.  She    Has been on vitamin D in the past she is taking some gabapentin and also omeprazole for stomach    She has had some cervical spinal stenosis noted on an MRI in December 2017 she has seen spine care at that time    She does get some pain occasionally in through her neck and arms and back.  Although right now it is not too bad.    Patient is due for immunizations with flu shot and COVID-19 shots but she refuses.    I did discuss with her mammogram and put in a referral.  We will get her scheduled.    I also put in a referral for care coordination/care management, to help her regarding  "transportation to see if she needs any help at home, possibly help with medicine.    At this point I want to see her back for recheck renal ultrasound we will recheck in about 1 month.    Review of Systems    10 point review of systems positive as outlined above otherwise negative    Current Outpatient Medications   Medication     acetaminophen (TYLENOL EXTRA STRENGTH) 500 MG tablet     calcium, as carbonate, (TUMS) 200 mg calcium (500 mg) chewable tablet     cholecalciferol, vitamin D3, 2,000 unit Tab     docusate sodium (COLACE) 100 MG capsule     gabapentin (NEURONTIN) 300 MG capsule     ibuprofen (ADVIL,MOTRIN) 400 MG tablet     omeprazole (PRILOSEC) 20 MG DR capsule     ondansetron (ZOFRAN) 4 MG tablet     polyethylene glycol (MIRALAX) 17 GM/Dose powder     sucralfate (CARAFATE) 1 GM tablet     No current facility-administered medications for this visit.       Objective    Vitals: /75 (BP Location: Left arm, Patient Position: Sitting, Cuff Size: Adult Regular)   Pulse 73   Temp 97.6  F (36.4  C) (Temporal)   Resp 18   Ht 1.416 m (4' 7.75\")   Wt 54.9 kg (121 lb)   BMI 27.37 kg/m    BMI= Body mass index is 27.37 kg/m .     Physical Exam    General appearance no acute distress    Blood pressure looks good    HEENT unremarkable no nasal drainage oropharynx is clear    Lungs are clear no rales or rhonchi, heart is regular S1-S2 no murmur    Abdomen soft nontender    Extremities without edema.    She does not have a lot of tenderness into her back at this time.            Beltran Alva MD   "

## 2021-11-10 ENCOUNTER — PATIENT OUTREACH (OUTPATIENT)
Dept: NURSING | Facility: CLINIC | Age: 51
End: 2021-11-10
Payer: COMMERCIAL

## 2021-11-10 NOTE — PROGRESS NOTES
Clinic Care Coordination Contact  Community Health Worker Initial Outreach    Reason: referral to Clinic Care Coordination Service     name: Geovanna Nava  Agency: [facility]  Service    CHW Initial Information Gathering:  Referral Source: PCP  Preferred Hospital: [facility]  [phone]  Preferred Urgent Care: [facility], [phone]  Current living arrangement:: I live in a private home with family  Type of residence:: Apartment  Informal Support system:: Family,Children  No PCP office visit in Past Year: No  Transportation means:: Other,Family (Family/children and medical transporation through medical health insurance). Patient stated that her children drive.       Discussion:   Patient was identified as a potential candidate and referred to Clinic Care Coordination Service. I call and spoke with patient today through  service to discuss possible clinic care coordination enrollment. Have introduced myself to patient. Clinic care coordination service was described to the patient and immediate needs were discussed. Completed CHW initial screening above with patient. CHW explained [facility] monthly outreach standard policy.     Patient accepts CC: Yes. Patient scheduled for assessment with [facility]  on 11- at 11:00AM. [facility] and energy assistance program. Note/comment: patient is aware that [facility] SW will contact her via phone at appt date and time.

## 2021-11-17 ENCOUNTER — PATIENT OUTREACH (OUTPATIENT)
Dept: CARE COORDINATION | Facility: CLINIC | Age: 51
End: 2021-11-17
Payer: COMMERCIAL

## 2021-11-19 ENCOUNTER — PATIENT OUTREACH (OUTPATIENT)
Dept: NURSING | Facility: CLINIC | Age: 51
End: 2021-11-19
Payer: COMMERCIAL

## 2021-11-19 NOTE — PROGRESS NOTES
"Clinic Care Coordination Contact  [facility]/Voicemail    Reason(s):   -Referral to Clinic Care Coordination   -Message from [facility] JEANNE      ID#: [id number]  Agency: [employer]     Clinical Data: Care Coordinator Outreach:  Outreach attempted x 2.  Left message on patient's voicemail with call back information and requested return call.     Note/comment:   -Per message from [facility] JEANNE; Pt was a no show for [facility] JEANNE visit.  Please reschedule at next outreach if needed.\" Please see  notes encounter dated 11- for detail if needs.    Plan: Final attempt- Care Coordinator will try to reach patient again in 3-7 business days if no returning called.      "

## 2021-11-24 ENCOUNTER — PATIENT OUTREACH (OUTPATIENT)
Dept: NURSING | Facility: CLINIC | Age: 51
End: 2021-11-24
Payer: COMMERCIAL

## 2021-11-24 NOTE — LETTER
[facility] CARE COORDINATION  [address] [address]  November 24, 2021    [address]  [address]  [place] [address]      Dear Way,    I am a clinic care coordinator who works with Beltran Alva MD at [facility]. I have been trying to reach you recently to introduce Clinic Care Coordination and to see if there was anything I could assist you with.  Below is a description of clinic care coordination and how I can further assist you.      The clinic care coordination team is made up of a registered nurse,  and community health worker who understand the health care system. The goal of clinic care coordination is to help you manage your health and improve access to the health care system in the most efficient manner. The team can assist you in meeting your health care goals by providing education, coordinating services, strengthening the communication among your providers and supporting you with any resource needs.    Please feel free to contact the Community Health Worker at [phone] with any questions or concerns. We are focused on providing you with the highest-quality healthcare experience possible and that all starts with you. Thank you.    Sincerely,     Debra Avendano, CCC CHW

## 2021-11-24 NOTE — PROGRESS NOTES
"Clinic Care Coordination Contact  [facility]/Voicemail    Reason(s):   -Referral to Clinic Care Coordination   -Message from [facility] JEANNE      ID#: [id number]  Agency: [employer]    Clinical Data: Care Coordinator Outreach:  Outreach attempted x 3.  Left message on patient's voicemail with call back information and requested return call.    Note/comment:   -Per message from [facility] JEANNE; Pt was a no show for [facility] visit.  Please reschedule at next outreach if needed.\" Please see  notes encounter dated 11- for detail if needs.    Plan: Care Coordinator will send care coordination introduction letter with care coordinator contact information and explanation of care coordination services via mail. Care Coordinator will do no further outreaches at this time.      "

## 2021-12-22 ENCOUNTER — OFFICE VISIT (OUTPATIENT)
Dept: FAMILY MEDICINE | Facility: CLINIC | Age: 51
End: 2021-12-22
Payer: COMMERCIAL

## 2021-12-22 VITALS
RESPIRATION RATE: 18 BRPM | HEART RATE: 89 BPM | BODY MASS INDEX: 27.82 KG/M2 | SYSTOLIC BLOOD PRESSURE: 106 MMHG | WEIGHT: 123 LBS | DIASTOLIC BLOOD PRESSURE: 68 MMHG

## 2021-12-22 DIAGNOSIS — Z00.00 HEALTH CARE MAINTENANCE: ICD-10-CM

## 2021-12-22 DIAGNOSIS — Z71.85 IMMUNIZATION COUNSELING: ICD-10-CM

## 2021-12-22 DIAGNOSIS — K29.70 GASTRITIS WITHOUT BLEEDING, UNSPECIFIED CHRONICITY, UNSPECIFIED GASTRITIS TYPE: ICD-10-CM

## 2021-12-22 DIAGNOSIS — K59.00 CONSTIPATION, UNSPECIFIED CONSTIPATION TYPE: ICD-10-CM

## 2021-12-22 DIAGNOSIS — M48.02 CERVICAL STENOSIS OF SPINAL CANAL: Primary | ICD-10-CM

## 2021-12-22 DIAGNOSIS — E55.9 VITAMIN D DEFICIENCY: ICD-10-CM

## 2021-12-22 DIAGNOSIS — K29.60 OTHER GASTRITIS WITHOUT HEMORRHAGE, UNSPECIFIED CHRONICITY: ICD-10-CM

## 2021-12-22 LAB
ALBUMIN SERPL-MCNC: 3.9 G/DL (ref 3.5–5)
ALP SERPL-CCNC: 101 U/L (ref 45–120)
ALT SERPL W P-5'-P-CCNC: 21 U/L (ref 0–45)
ANION GAP SERPL CALCULATED.3IONS-SCNC: 11 MMOL/L (ref 5–18)
AST SERPL W P-5'-P-CCNC: 19 U/L (ref 0–40)
BILIRUB SERPL-MCNC: 0.3 MG/DL (ref 0–1)
BUN SERPL-MCNC: 9 MG/DL (ref 8–22)
CALCIUM SERPL-MCNC: 9.4 MG/DL (ref 8.5–10.5)
CHLORIDE BLD-SCNC: 106 MMOL/L (ref 98–107)
CHOLEST SERPL-MCNC: 215 MG/DL
CO2 SERPL-SCNC: 24 MMOL/L (ref 22–31)
CREAT SERPL-MCNC: 0.77 MG/DL (ref 0.6–1.1)
ERYTHROCYTE [DISTWIDTH] IN BLOOD BY AUTOMATED COUNT: 12.5 % (ref 10–15)
FASTING STATUS PATIENT QL REPORTED: ABNORMAL
GFR SERPL CREATININE-BSD FRML MDRD: >90 ML/MIN/1.73M2
GLUCOSE BLD-MCNC: 109 MG/DL (ref 70–125)
HCT VFR BLD AUTO: 37.6 % (ref 35–47)
HDLC SERPL-MCNC: 43 MG/DL
HGB BLD-MCNC: 12.1 G/DL (ref 11.7–15.7)
LDLC SERPL CALC-MCNC: ABNORMAL MG/DL
MCH RBC QN AUTO: 25.9 PG (ref 26.5–33)
MCHC RBC AUTO-ENTMCNC: 32.2 G/DL (ref 31.5–36.5)
MCV RBC AUTO: 80 FL (ref 78–100)
PLATELET # BLD AUTO: 264 10E3/UL (ref 150–450)
POTASSIUM BLD-SCNC: 3.8 MMOL/L (ref 3.5–5)
PROT SERPL-MCNC: 7.7 G/DL (ref 6–8)
RBC # BLD AUTO: 4.68 10E6/UL (ref 3.8–5.2)
SODIUM SERPL-SCNC: 141 MMOL/L (ref 136–145)
TRIGL SERPL-MCNC: 430 MG/DL
TSH SERPL DL<=0.005 MIU/L-ACNC: 1.18 UIU/ML (ref 0.3–5)
WBC # BLD AUTO: 5.4 10E3/UL (ref 4–11)

## 2021-12-22 PROCEDURE — 80053 COMPREHEN METABOLIC PANEL: CPT | Performed by: FAMILY MEDICINE

## 2021-12-22 PROCEDURE — 80061 LIPID PANEL: CPT | Performed by: FAMILY MEDICINE

## 2021-12-22 PROCEDURE — 85027 COMPLETE CBC AUTOMATED: CPT | Performed by: FAMILY MEDICINE

## 2021-12-22 PROCEDURE — 99214 OFFICE O/P EST MOD 30 MIN: CPT | Performed by: FAMILY MEDICINE

## 2021-12-22 PROCEDURE — 84443 ASSAY THYROID STIM HORMONE: CPT | Performed by: FAMILY MEDICINE

## 2021-12-22 PROCEDURE — 82306 VITAMIN D 25 HYDROXY: CPT | Performed by: FAMILY MEDICINE

## 2021-12-22 PROCEDURE — 36415 COLL VENOUS BLD VENIPUNCTURE: CPT | Performed by: FAMILY MEDICINE

## 2021-12-22 RX ORDER — DOCUSATE SODIUM 100 MG/1
100 CAPSULE, LIQUID FILLED ORAL 2 TIMES DAILY
Qty: 180 CAPSULE | Refills: 1 | Status: SHIPPED | OUTPATIENT
Start: 2021-12-22 | End: 2024-01-03

## 2021-12-22 RX ORDER — CALCIUM CARBONATE 500 MG/1
TABLET, CHEWABLE ORAL
Qty: 90 TABLET | Refills: 6 | Status: SHIPPED | OUTPATIENT
Start: 2021-12-22 | End: 2024-01-03

## 2021-12-22 RX ORDER — SUCRALFATE 1 G/1
1 TABLET ORAL 4 TIMES DAILY
Qty: 120 TABLET | Refills: 3 | Status: SHIPPED | OUTPATIENT
Start: 2021-12-22 | End: 2024-01-04

## 2021-12-22 NOTE — PROGRESS NOTES
Assessment & Plan        ICD-10-CM    1. Cervical stenosis of spinal canal  M48.02    2. Other gastritis without hemorrhage, unspecified chronicity  K29.60 omeprazole (PRILOSEC) 20 MG DR capsule     sucralfate (CARAFATE) 1 GM tablet     CBC with platelets   3. Vitamin D deficiency  E55.9 Vitamin D Deficiency   4. Constipation, unspecified constipation type  K59.00 docusate sodium (COLACE) 100 MG capsule     TSH   5. Gastritis without bleeding, unspecified chronicity, unspecified gastritis type  K29.70 calcium carbonate (TUMS) 500 MG chewable tablet   6. Immunization counseling  Z71.85    7. Health care maintenance  Z00.00 Comprehensive metabolic panel (BMP + Alb, Alk Phos, ALT, AST, Total. Bili, TP)     Lipid Profile (Chol, Trig, HDL, LDL calc)        Cervical stenosis of the spinal cord presently with minimal symptoms    Gastritis ongoing symptoms continue omeprazole use Carafate for calcium carbonate as needed in addition.    Immunization counseling discussed the need for the flu shot and Covid shots but she refuses.    Constipation discussed diet increasing fluids and using the Colace on a as needed basis.    We will check vitamin D level she is not on any replacement.    Patient will be contacted with results and discuss follow-up      Return in about 6 months (around 6/22/2022) for Follow up. for recheck.        Subjective:    This 51 year old female was seen today for evaluation.    Patient has had cervical stenosis but does not have significant symptoms now.  Not on any medication.    She does have a history of constipation and uses Docosate 100 mg 1 twice a day as needed I did refill that.  Also discussed increasing fruits vegetables and increasing water.    Her gastritis has been controlled with omeprazole 20 mg 1 twice a day.    In the past she is used Carafate as needed and also calcium carbonate.  I did refill those medications and she will see which one seems to work the best.    Patient has not had  labs for a while reviewed we did recheck CMP lipid TSH and vitamin D.  She has not been taking any vitamin D.    Again discussed her need for flu shot and Covid shot, but the patient refuses.  Daughter also has not had a shots I strongly encouraged them to both get the shots.      Review of Systems    10 point review of systems positive as outlined above otherwise negative    Current Outpatient Medications   Medication     acetaminophen (TYLENOL EXTRA STRENGTH) 500 MG tablet     calcium carbonate (TUMS) 500 MG chewable tablet     docusate sodium (COLACE) 100 MG capsule     omeprazole (PRILOSEC) 20 MG DR capsule     polyethylene glycol (MIRALAX) 17 GM/Dose powder     sucralfate (CARAFATE) 1 GM tablet     No current facility-administered medications for this visit.       Objective    Vitals: /68 (BP Location: Left arm, Patient Position: Sitting, Cuff Size: Adult Regular)   Pulse 89   Resp 18   Wt 55.8 kg (123 lb)   BMI 27.82 kg/m    BMI= Body mass index is 27.82 kg/m .     Physical Exam    General appearance no acute distress    HEENT neck without significant tenderness fairly good range of motion through the neck    Lungs are clear throughout no rales or rhonchi.    Heart regular S1-S2 rate at 90.    Skin is normal no rashes.    Abdomen nontender no epigastric pain    Extremities without edema.    Lab work CMP lipid TSH and vitamin D pending        Beltran Alva MD    50

## 2021-12-23 DIAGNOSIS — E55.9 VITAMIN D DEFICIENCY: Primary | ICD-10-CM

## 2021-12-23 LAB — DEPRECATED CALCIDIOL+CALCIFEROL SERPL-MC: 11 UG/L (ref 30–80)

## 2021-12-23 RX ORDER — CHOLECALCIFEROL (VITAMIN D3) 50 MCG
1 TABLET ORAL DAILY
Qty: 90 TABLET | Refills: 1 | Status: SHIPPED | OUTPATIENT
Start: 2021-12-23 | End: 2024-01-04

## 2021-12-24 ENCOUNTER — TELEPHONE (OUTPATIENT)
Dept: FAMILY MEDICINE | Facility: CLINIC | Age: 51
End: 2021-12-24
Payer: COMMERCIAL

## 2021-12-24 NOTE — TELEPHONE ENCOUNTER
----- Message from Beltran Alva MD sent at 12/23/2021  1:00 PM CST -----  Please contact this patient.  Let her know the vitamin D level is low at 11, normal is 30-80.  She needs to go on vitamin D 2000 units daily I sent a prescription to her pharmacy.The thyroid and liver and kidney and electrolytes and blood sugar were all normal also the hemoglobin and white count were normal.The triglycerides were elevated at 430 should be less than 150.  This means she is eating too many carbohydrates.  She needs to decrease her intake of rice bread sugar Posta sweets desserts juice pop etc.I would like her to follow-up in about 4 to 5 months and will recheck on a fasting lipid panel and recheck on the vitamin D level.  Have the patient come in fasting for the office visit

## 2021-12-24 NOTE — LETTER
December 31, 2021      [facility]  [address]  [address]  [place] [address]        Dear ,    We are writing to inform you of your test results.    Your vitamin D level is low at 11, normal is 30-80. You need to go on vitamin D 2000 units daily I sent a prescription to your pharmacy.The thyroid and liver and kidney and electrolytes and blood sugar were all normal also the hemoglobin and white count were normal.The triglycerides were elevated at 430 should be less than 150.  This means you are eating too many carbohydrates.  You need to decrease your intake of rice bread sugar Posta sweets desserts juice pop etc.I would like you to follow-up in about 4 to 5 months and will recheck on a fasting lipid panel and recheck on the vitamin D level.  Please come in fasting for the office visit.         Resulted Orders   Comprehensive metabolic panel (BMP + Alb, Alk Phos, ALT, AST, Total. Bili, TP)   Result Value Ref Range    Sodium 141 136 - 145 mmol/L    Potassium 3.8 3.5 - 5.0 mmol/L    Chloride 106 98 - 107 mmol/L    Carbon Dioxide (CO2) 24 22 - 31 mmol/L    Anion Gap 11 5 - 18 mmol/L    Urea Nitrogen 9 8 - 22 mg/dL    Creatinine 0.77 0.60 - 1.10 mg/dL    Calcium 9.4 8.5 - 10.5 mg/dL    Glucose 109 70 - 125 mg/dL    Alkaline Phosphatase 101 45 - 120 U/L    AST 19 0 - 40 U/L    ALT 21 0 - 45 U/L    Protein Total 7.7 6.0 - 8.0 g/dL    Albumin 3.9 3.5 - 5.0 g/dL    Bilirubin Total 0.3 0.0 - 1.0 mg/dL    GFR Estimate >90 >60 mL/min/1.73m2      Comment:      Effective December 21, 2021 eGFRcr in adults is calculated using the 2021 CKD-EPI creatinine equation which includes age and gender (Chantal et al., NEJM, DOI: 10.1056/ZTIXqj4346176)   Lipid Profile (Chol, Trig, HDL, LDL calc)   Result Value Ref Range    Cholesterol 215 (H) <=199 mg/dL    Triglycerides 430 (H) <=149 mg/dL    Direct Measure HDL 43 (L) >=50 mg/dL      Comment:      HDL Cholesterol Reference Range:     0-2 years:   No reference ranges established for  patients under 2 years old  at [facility] for lipid analytes.    2-8 years:  Greater than 45 mg/dL     18 years and older:   Female: Greater than or equal to 50 mg/dL   Male:   Greater than or equal to 40 mg/dL    LDL Cholesterol Calculated        Comment:      Cannot estimate LDL when triglyceride exceeds 400 mg/dL    Patient Fasting > 8hrs? Unknown    TSH   Result Value Ref Range    TSH 1.18 0.30 - 5.00 uIU/mL   CBC with platelets   Result Value Ref Range    WBC Count 5.4 4.0 - 11.0 10e3/uL    RBC Count 4.68 3.80 - 5.20 10e6/uL    Hemoglobin 12.1 11.7 - 15.7 g/dL    Hematocrit 37.6 35.0 - 47.0 %    MCV 80 78 - 100 fL    MCH 25.9 (L) 26.5 - 33.0 pg    MCHC 32.2 31.5 - 36.5 g/dL    RDW 12.5 10.0 - 15.0 %    Platelet Count 264 150 - 450 10e3/uL   Vitamin D Deficiency   Result Value Ref Range    Vitamin D, Total (25-Hydroxy) 11 (L) 30 - 80 ug/L    Narrative    Deficiency <10.0 ug/L  Insufficiency 10.0-29.9 ug/L  Sufficiency 30.0-80.0 ug/L  Toxicity (possible) >100.0 ug/L        If you have any questions or concerns, please call the clinic at the number listed above.       Sincerely,

## 2021-12-24 NOTE — LETTER
December 28, 2021      [facility]  [address]  [address]  [place] [address]        Dear ,    We are writing to inform you of your test results.    Please make an appointment with your provider to review or follow up on your test results.  Appointments can be made by calling [phone].    Resulted Orders   Comprehensive metabolic panel (BMP + Alb, Alk Phos, ALT, AST, Total. Bili, TP)   Result Value Ref Range    Sodium 141 136 - 145 mmol/L    Potassium 3.8 3.5 - 5.0 mmol/L    Chloride 106 98 - 107 mmol/L    Carbon Dioxide (CO2) 24 22 - 31 mmol/L    Anion Gap 11 5 - 18 mmol/L    Urea Nitrogen 9 8 - 22 mg/dL    Creatinine 0.77 0.60 - 1.10 mg/dL    Calcium 9.4 8.5 - 10.5 mg/dL    Glucose 109 70 - 125 mg/dL    Alkaline Phosphatase 101 45 - 120 U/L    AST 19 0 - 40 U/L    ALT 21 0 - 45 U/L    Protein Total 7.7 6.0 - 8.0 g/dL    Albumin 3.9 3.5 - 5.0 g/dL    Bilirubin Total 0.3 0.0 - 1.0 mg/dL    GFR Estimate >90 >60 mL/min/1.73m2      Comment:      Effective December 21, 2021 eGFRcr in adults is calculated using the 2021 CKD-EPI creatinine equation which includes age and gender (Chantal et al., NE, DOI: 10.1056/YWOAoz0624336)   Lipid Profile (Chol, Trig, HDL, LDL calc)   Result Value Ref Range    Cholesterol 215 (H) <=199 mg/dL    Triglycerides 430 (H) <=149 mg/dL    Direct Measure HDL 43 (L) >=50 mg/dL      Comment:      HDL Cholesterol Reference Range:     0-2 years:   No reference ranges established for patients under 2 years old  at [facility] for lipid analytes.    2-8 years:  Greater than 45 mg/dL     18 years and older:   Female: Greater than or equal to 50 mg/dL   Male:   Greater than or equal to 40 mg/dL    LDL Cholesterol Calculated        Comment:      Cannot estimate LDL when triglyceride exceeds 400 mg/dL    Patient Fasting > 8hrs? Unknown    TSH   Result Value Ref Range    TSH 1.18 0.30 - 5.00 uIU/mL   CBC with platelets   Result Value Ref Range    WBC Count 5.4 4.0 - 11.0 10e3/uL     RBC Count 4.68 3.80 - 5.20 10e6/uL    Hemoglobin 12.1 11.7 - 15.7 g/dL    Hematocrit 37.6 35.0 - 47.0 %    MCV 80 78 - 100 fL    MCH 25.9 (L) 26.5 - 33.0 pg    MCHC 32.2 31.5 - 36.5 g/dL    RDW 12.5 10.0 - 15.0 %    Platelet Count 264 150 - 450 10e3/uL   Vitamin D Deficiency   Result Value Ref Range    Vitamin D, Total (25-Hydroxy) 11 (L) 30 - 80 ug/L    Narrative    Deficiency <10.0 ug/L  Insufficiency 10.0-29.9 ug/L  Sufficiency 30.0-80.0 ug/L  Toxicity (possible) >100.0 ug/L        If you have any questions or concerns, please call the clinic at the number listed above.       Sincerely,

## 2021-12-28 NOTE — TELEPHONE ENCOUNTER
"Attempt to call pt, left voice mail for patient to call clinic back .#2     \" Okay to relay message\"      Sending out letter .   "

## 2022-07-05 DIAGNOSIS — Z12.31 VISIT FOR SCREENING MAMMOGRAM: Primary | ICD-10-CM

## 2024-01-03 ENCOUNTER — PATIENT OUTREACH (OUTPATIENT)
Dept: CARE COORDINATION | Facility: CLINIC | Age: 54
End: 2024-01-03

## 2024-01-03 ENCOUNTER — ANCILLARY PROCEDURE (OUTPATIENT)
Dept: GENERAL RADIOLOGY | Facility: CLINIC | Age: 54
End: 2024-01-03
Attending: FAMILY MEDICINE

## 2024-01-03 ENCOUNTER — OFFICE VISIT (OUTPATIENT)
Dept: FAMILY MEDICINE | Facility: CLINIC | Age: 54
End: 2024-01-03

## 2024-01-03 ENCOUNTER — NURSE TRIAGE (OUTPATIENT)
Dept: NURSING | Facility: CLINIC | Age: 54
End: 2024-01-03

## 2024-01-03 ENCOUNTER — TELEPHONE (OUTPATIENT)
Dept: FAMILY MEDICINE | Facility: CLINIC | Age: 54
End: 2024-01-03

## 2024-01-03 VITALS
OXYGEN SATURATION: 99 % | RESPIRATION RATE: 18 BRPM | HEIGHT: 56 IN | TEMPERATURE: 97.8 F | HEART RATE: 68 BPM | SYSTOLIC BLOOD PRESSURE: 128 MMHG | DIASTOLIC BLOOD PRESSURE: 70 MMHG | BODY MASS INDEX: 27.44 KG/M2 | WEIGHT: 122 LBS

## 2024-01-03 DIAGNOSIS — G56.02 CARPAL TUNNEL SYNDROME OF LEFT WRIST: ICD-10-CM

## 2024-01-03 DIAGNOSIS — K29.50 CHRONIC GASTRITIS WITHOUT BLEEDING, UNSPECIFIED GASTRITIS TYPE: Primary | ICD-10-CM

## 2024-01-03 DIAGNOSIS — R52 GENERALIZED BODY ACHES: ICD-10-CM

## 2024-01-03 DIAGNOSIS — Z00.00 HEALTHCARE MAINTENANCE: ICD-10-CM

## 2024-01-03 DIAGNOSIS — M54.2 NECK PAIN: ICD-10-CM

## 2024-01-03 DIAGNOSIS — M48.02 CERVICAL STENOSIS OF SPINAL CANAL: ICD-10-CM

## 2024-01-03 DIAGNOSIS — Z11.4 SCREENING FOR HIV (HUMAN IMMUNODEFICIENCY VIRUS): ICD-10-CM

## 2024-01-03 DIAGNOSIS — Z11.59 NEED FOR HEPATITIS C SCREENING TEST: ICD-10-CM

## 2024-01-03 DIAGNOSIS — Z12.31 VISIT FOR SCREENING MAMMOGRAM: ICD-10-CM

## 2024-01-03 DIAGNOSIS — Z01.84 IMMUNITY STATUS TESTING: ICD-10-CM

## 2024-01-03 DIAGNOSIS — E55.9 VITAMIN D DEFICIENCY: ICD-10-CM

## 2024-01-03 DIAGNOSIS — E53.8 LOW FOLATE: ICD-10-CM

## 2024-01-03 LAB
ALBUMIN SERPL BCG-MCNC: 4.4 G/DL (ref 3.5–5.2)
ALP SERPL-CCNC: 88 U/L (ref 40–150)
ALT SERPL W P-5'-P-CCNC: 19 U/L (ref 0–50)
ANION GAP SERPL CALCULATED.3IONS-SCNC: 9 MMOL/L (ref 7–15)
AST SERPL W P-5'-P-CCNC: 25 U/L (ref 0–45)
BASOPHILS # BLD AUTO: 0 10E3/UL (ref 0–0.2)
BASOPHILS NFR BLD AUTO: 1 %
BILIRUB DIRECT SERPL-MCNC: <0.2 MG/DL (ref 0–0.3)
BILIRUB SERPL-MCNC: 0.2 MG/DL
BUN SERPL-MCNC: 12 MG/DL (ref 6–20)
CALCIUM SERPL-MCNC: 9.1 MG/DL (ref 8.6–10)
CHLORIDE SERPL-SCNC: 106 MMOL/L (ref 98–107)
CHOLEST SERPL-MCNC: 245 MG/DL
CK SERPL-CCNC: 145 U/L (ref 26–192)
CREAT SERPL-MCNC: 0.68 MG/DL (ref 0.51–0.95)
DEPRECATED HCO3 PLAS-SCNC: 27 MMOL/L (ref 22–29)
EGFRCR SERPLBLD CKD-EPI 2021: >90 ML/MIN/1.73M2
EOSINOPHIL # BLD AUTO: 0.3 10E3/UL (ref 0–0.7)
EOSINOPHIL NFR BLD AUTO: 4 %
ERYTHROCYTE [DISTWIDTH] IN BLOOD BY AUTOMATED COUNT: 12.3 % (ref 10–15)
FASTING STATUS PATIENT QL REPORTED: NO
FOLATE SERPL-MCNC: 3.5 NG/ML (ref 4.6–34.8)
GLUCOSE SERPL-MCNC: 91 MG/DL (ref 70–99)
HBV SURFACE AB SERPL IA-ACNC: >1000 M[IU]/ML
HBV SURFACE AB SERPL IA-ACNC: REACTIVE M[IU]/ML
HCT VFR BLD AUTO: 38.9 % (ref 35–47)
HCV AB SERPL QL IA: NONREACTIVE
HDLC SERPL-MCNC: 45 MG/DL
HGB BLD-MCNC: 12.6 G/DL (ref 11.7–15.7)
HIV 1+2 AB+HIV1 P24 AG SERPL QL IA: NONREACTIVE
IMM GRANULOCYTES # BLD: 0 10E3/UL
IMM GRANULOCYTES NFR BLD: 0 %
LDLC SERPL CALC-MCNC: 135 MG/DL
LIPASE SERPL-CCNC: 58 U/L (ref 13–60)
LYMPHOCYTES # BLD AUTO: 2.2 10E3/UL (ref 0.8–5.3)
LYMPHOCYTES NFR BLD AUTO: 36 %
MCH RBC QN AUTO: 26.1 PG (ref 26.5–33)
MCHC RBC AUTO-ENTMCNC: 32.4 G/DL (ref 31.5–36.5)
MCV RBC AUTO: 81 FL (ref 78–100)
MONOCYTES # BLD AUTO: 0.5 10E3/UL (ref 0–1.3)
MONOCYTES NFR BLD AUTO: 9 %
NEUTROPHILS # BLD AUTO: 3.1 10E3/UL (ref 1.6–8.3)
NEUTROPHILS NFR BLD AUTO: 50 %
NONHDLC SERPL-MCNC: 200 MG/DL
PLATELET # BLD AUTO: 269 10E3/UL (ref 150–450)
POTASSIUM SERPL-SCNC: 3.6 MMOL/L (ref 3.4–5.3)
PROT SERPL-MCNC: 8.1 G/DL (ref 6.4–8.3)
RBC # BLD AUTO: 4.82 10E6/UL (ref 3.8–5.2)
SODIUM SERPL-SCNC: 142 MMOL/L (ref 135–145)
TRIGL SERPL-MCNC: 324 MG/DL
TSH SERPL DL<=0.005 MIU/L-ACNC: 0.92 UIU/ML (ref 0.3–4.2)
WBC # BLD AUTO: 6.2 10E3/UL (ref 4–11)

## 2024-01-03 PROCEDURE — 36415 COLL VENOUS BLD VENIPUNCTURE: CPT | Performed by: FAMILY MEDICINE

## 2024-01-03 PROCEDURE — 82306 VITAMIN D 25 HYDROXY: CPT | Performed by: FAMILY MEDICINE

## 2024-01-03 PROCEDURE — 86706 HEP B SURFACE ANTIBODY: CPT | Performed by: FAMILY MEDICINE

## 2024-01-03 PROCEDURE — 80061 LIPID PANEL: CPT | Performed by: FAMILY MEDICINE

## 2024-01-03 PROCEDURE — 80053 COMPREHEN METABOLIC PANEL: CPT | Performed by: FAMILY MEDICINE

## 2024-01-03 PROCEDURE — 86803 HEPATITIS C AB TEST: CPT | Performed by: FAMILY MEDICINE

## 2024-01-03 PROCEDURE — 84443 ASSAY THYROID STIM HORMONE: CPT | Performed by: FAMILY MEDICINE

## 2024-01-03 PROCEDURE — 85025 COMPLETE CBC W/AUTO DIFF WBC: CPT | Performed by: FAMILY MEDICINE

## 2024-01-03 PROCEDURE — 82746 ASSAY OF FOLIC ACID SERUM: CPT | Performed by: FAMILY MEDICINE

## 2024-01-03 PROCEDURE — 82248 BILIRUBIN DIRECT: CPT | Performed by: FAMILY MEDICINE

## 2024-01-03 PROCEDURE — 82550 ASSAY OF CK (CPK): CPT | Performed by: FAMILY MEDICINE

## 2024-01-03 PROCEDURE — 83690 ASSAY OF LIPASE: CPT | Performed by: FAMILY MEDICINE

## 2024-01-03 PROCEDURE — 99214 OFFICE O/P EST MOD 30 MIN: CPT | Performed by: FAMILY MEDICINE

## 2024-01-03 PROCEDURE — 72040 X-RAY EXAM NECK SPINE 2-3 VW: CPT | Mod: TC | Performed by: RADIOLOGY

## 2024-01-03 PROCEDURE — 87389 HIV-1 AG W/HIV-1&-2 AB AG IA: CPT | Performed by: FAMILY MEDICINE

## 2024-01-03 PROCEDURE — 82607 VITAMIN B-12: CPT | Performed by: FAMILY MEDICINE

## 2024-01-03 RX ORDER — BACLOFEN 10 MG/1
10 TABLET ORAL 3 TIMES DAILY PRN
Qty: 60 TABLET | Refills: 3 | Status: SHIPPED | OUTPATIENT
Start: 2024-01-03 | End: 2024-02-19

## 2024-01-03 RX ORDER — FAMOTIDINE 40 MG/1
40 TABLET, FILM COATED ORAL
Qty: 30 TABLET | Refills: 3 | Status: SHIPPED | OUTPATIENT
Start: 2024-01-03 | End: 2024-02-19

## 2024-01-03 RX ORDER — ACETAMINOPHEN 500 MG
1000 TABLET ORAL EVERY 8 HOURS PRN
Qty: 100 TABLET | Refills: 2 | Status: SHIPPED | OUTPATIENT
Start: 2024-01-03 | End: 2024-02-19

## 2024-01-03 NOTE — TELEPHONE ENCOUNTER
Talked to patient and she would like to establish care with a provider here at [place]. Appt already scheduled with Dr. Ibarra for Establish Care. Thank you.

## 2024-01-03 NOTE — TELEPHONE ENCOUNTER
Pt is phoning with an      Pt states that she is having abdominal pain     Rates abdominal pain 10/10    Per disposition: Go to ED Now     Care advice given per protocol and when to call back. Pt verbalized understanding and agrees to plan of care.    China Dela Cruz RN  [facility] Nurse Advisor  11:19 AM 1/3/2024        Reason for Disposition   SEVERE abdominal pain (e.g., excruciating)    Additional Information   Negative: Passed out (i.e., fainted, collapsed and was not responding)   Negative: Shock suspected (e.g., cold/pale/clammy skin, too weak to stand, low BP, rapid pulse)   Negative: Sounds like a life-threatening emergency to the triager   Negative: Followed an abdomen (stomach) injury   Negative: Chest pain   Negative: Abdominal pain and pregnant < 20 weeks   Negative: Abdominal pain and pregnant 20 or more weeks   Negative: Pain is mainly in upper abdomen (if needed ask: 'is it mainly above the belly button?')   Negative: Abdomen bloating or swelling are main symptoms    Protocols used: Abdominal Pain - Female-A-[place]

## 2024-01-03 NOTE — PROGRESS NOTES
OFFICE VISIT    Assessment/Plan:     Patient Instructions:    -Please go to lab and x-ray before you leave.   -Take the medications as prescribed.   -Further recommendations will be made once the labs and imaging are completed.   -Do the exercises for your neck and left wrist.     -You are due for a mammogram and pap smear. Please schedule a general physical to complete these.     Please seek immediate medical attention (go to the emergency room or urgent care) for the following reasons: worsening symptoms, or any concerning changes.    Way was seen today for abdominal pain.    Diagnoses and all orders for this visit:    Chronic gastritis without bleeding, unspecified gastritis type: restarting. Trial with famotidine as below. Check labs.   -     famotidine (PEPCID) 40 MG tablet; Take 1 tablet (40 mg) by mouth nightly as needed for heartburn  -     Helicobacter pylori Antigen Stool; Future  -     Lipase; Future    Neck pain  Cervical stenosis of spinal canal  Generalized body aches: Muscle tightness noted bilaterally. Check labs as below. Home exercises given. No injuries reported. Neck and CTS exrcises given to patient   -     Basic metabolic panel; Future  -     CBC with Platelets & Differential; Future  -     Hepatic function panel; Future  -     Lipid panel reflex to direct LDL Non-fasting; Future  -     TSH with free T4 reflex; Future  -     CK total; Future  -     baclofen (LIORESAL) 10 MG tablet; Take 1 tablet (10 mg) by mouth 3 times daily as needed for muscle spasms  -     acetaminophen (TYLENOL) 500 MG tablet; Take 2 tablets (1,000 mg) by mouth every 8 hours as needed for pain or fever  -     XR Cervical Spine 2/3 Views; Future    Carpal tunnel syndrome of left wrist: plan for conservative management at this time.     Low folate  Vitamin D deficiency  -     Folate; Future  -     Vitamin D Deficiency; Future  -     Vitamin B12; Future    Healthcare maintenance  -     Basic metabolic panel; Future  -     CBC  with Platelets & Differential; Future  -     Hepatic function panel; Future  -     Lipid panel reflex to direct LDL Non-fasting; Future  -     TSH with free T4 reflex; Future    Visit for screening mammogram: completed mammogram today.     Need for hepatitis C screening test  -     Hepatitis C Screen Reflex to HCV RNA Quant and Genotype; Future    Screening for HIV (human immunodeficiency virus)  -     HIV Antigen Antibody Combo; Future    Immunity status testing  -     Hepatitis B Surface Antibody; Future    Other orders  -     REVIEW OF HEALTH MAINTENANCE PROTOCOL ORDERS        Return in about 1 month (around 2/3/2024) for Annual Physical, Medication follow up.    The diagnoses, treatment options, risk, benefits, and recommendations were reviewed with patient/guardian.  Questions were answered to patient's/guardian satisfaction.  Red flag signs were reviewed.  Patient/guardian is in agreement with above plan.      Subjective: 54 year old female with history of gastritis, folate and vitamin D deficiency, constipation, cervical stenosis of spinal canal who presents to clinic for the following complaints:   Patient presents with:  Abdominal Pain: Heartburn, neck pain, headache, legs and arms ache.     Patient previously has a history of gastritis that has been controlled with omeprazole 20 mg twice a day.  She has used Carafate and calcium carbonate in the past as well. Since the last visit, she has had burning sensations in all her body. She also has burning sensations that are in the abdomen and causes heartburn.     She also has neck pain and dizziness at times.       Neck pain: Patient has a history of cervical stenosis.  She was asymptomatic when last evaluated in 12/2021.  MRI completed on 9/6/2019.  At that time, patient was noted to have moderate spinal canal stenosis at C3/C4 and mild to moderate spinal canal stenosis at C4/C5.  It is noted that these findings are predominantly due to diabetes and congenital  narrowing of the spinal canal.  Central disc protrusion at C3/C4 is also noted.  No high-grade foraminal stenosis is appreciated. Since the last visit, the neck pain has been present. The pain is worse in the deep neck area and causes pain that goes up behind the ears. There is muscle tightness along the neck area. Moving the neck does not make the symptoms worse. Massaging and pressure on the neck is helpful.     She does endorse having a headache and aches and pains in both arms and legs bilaterally.Denies any falls or injuries, weakness, tingling, or other changes from baseline.    The side with the surgery is better (right side). The left side continues to be tingling and painful that starts around wrist and fingers (first three digits).    HM due was reviewed with patient/parent.  Recommendations, risk, benefits were reviewed.  Accepted recommendations were ordered.  Otherwise, patient/parent declined.    Health Maintenance Due   Topic Date Due    YEARLY PREVENTIVE VISIT  Never done    ADVANCE CARE PLANNING  Never done    HEPATITIS B IMMUNIZATION (1 of 3 - 3-dose series) Never done    COVID-19 Vaccine (1) Never done    HIV SCREENING  Never done    HEPATITIS C SCREENING  Never done    PAP  Never done    ZOSTER IMMUNIZATION (1 of 2) Never done    MAMMO SCREENING  07/02/2020    INFLUENZA VACCINE (1) 09/01/2023           A professional [employer] telephone  was utilized for the office visit.     The 10 point review of system is negative except as stated in the HPI.    Allergies were reviewed and updated.    Narrative & Impression   EXAM: MR CERVICAL SPINE WO CONTRAST  LOCATION: [facility]  DATE/TIME: 9/5/2019 11:05 AM     INDICATION: C-spine canal stenosis.  COMPARISON: 12/8/2017.  TECHNIQUE: Without IV contrast.     FINDINGS:   Straightening of the normal cervical lordosis. Mild congenital baseline narrowing of the spinal canal. Vertebral body heights are maintained. No concerning marrow  "replacing lesions. No abnormal cord signal. Visualized posterior fossa contents are   unremarkable.     C2-C3: Normal disc height. No herniation. No facet arthropathy. No spinal canal stenosis. No right neural foraminal stenosis. No left neural foraminal stenosis.     C3-C4: Minimal posterior disc abnormality. Mild uncovertebral arthropathy. Mild facet arthropathy. Moderate spinal canal stenosis. No significant foraminal stenosis.     C4-C5: Shallow posterior disc osteophyte complex. Mild facet arthropathy. Mild to moderate spinal canal stenosis. No right neural foraminal stenosis. Mild to moderate left neural foraminal stenosis.       C5-C6: Minimal posterior disc osteophyte complex. Mild facet arthropathy. Mild spinal canal stenosis. No right neural foraminal stenosis. No left neural foraminal stenosis.     C6-C7: No significant posterior disc osteophyte complex. Mild facet arthropathy. No spinal canal stenosis. No right neural foraminal stenosis. No left neural foraminal stenosis.     C7-T1: No significant posterior disc abnormality. No facet arthropathy. No spinal canal stenosis. No right neural foraminal stenosis. No left neural foraminal stenosis.     IMPRESSION:  1.  Mild scattered degenerative changes of the cervical spine. While there is no large posterior disc abnormality, degenerative changes do result in moderate spinal canal stenosis at C3/C4 and mild to moderate spinal canal stenosis at C4/C5. This is   predominantly due to the baseline congenital narrowing of the spinal canal.     2.  Previously seen central disc protrusion at C3/C4 has improved from the prior exam.     3.  No high-grade foraminal stenosis.            Objective:   BP (!) 144/77   Pulse 68   Temp 97.8  F (36.6  C) (Oral)   Resp 18   Ht 1.415 m (4' 7.71\")   Wt 55.3 kg (122 lb)   SpO2 99%   BMI 27.64 kg/m    General: Active, alert, nontoxic-appearing.  No acute distress.  HEENT: Normocephalic, atraumatic.  Pupils are equal and " round.  Sclera is clear.  Normal external ears. Nares patent.  Moist mucous membranes.    Cardiac: RRR.  S1, S2 present.  No murmurs, rubs, or gallops.  Respiratory/chest: Clear to auscultation bilaterally.  No wheezes, rales, rhonchi.  Breathing is not labored.  No accessory muscle usage.  Back/neck: Muscle tightness noted along the trapezius muscle distribution as well as along the paracervical muscles.  Palpation causes the most discomfort at the insertion site at the occiput. No midline tenderness noted.  No swelling or lymphadenopathy appreciated at this site.  Normal range of motion of the neck/head noted.  Normal swallow reflex.  No cervical lymphadenopathy present.  Abdomen: Soft, nondistended, nontender.  No masses or organomegaly noted.  No guarding or rebound tenderness appreciated.  Extremities: Left: positive Tinel's and Phalen's tests. Sensation to light touch intact in all dermatomes. Normal strength and muscle tone. Voluntary movements intact.  Integumentary: No concerning rash or skin changes appreciated.        Ross Ibarra MD  [facility] [place] [address]  Phone: [phone]  Fax: [phone]    1/5/2024  7:36 AM          Current Outpatient Medications   Medication    acetaminophen (TYLENOL) 500 MG tablet    baclofen (LIORESAL) 10 MG tablet    famotidine (PEPCID) 40 MG tablet    polyethylene glycol (MIRALAX) 17 GM/Dose powder    sucralfate (CARAFATE) 1 GM tablet    vitamin D3 (CHOLECALCIFEROL) 50 mcg (2000 units) tablet     No current facility-administered medications for this visit.       Allergies   Allergen Reactions    Methylprednisolone Itching     Depo-medrol trigger finger injection        Patient Active Problem List    Diagnosis Date Noted    Vitamin D deficiency 12/15/2017     Priority: Medium    Low folate 12/05/2017     Priority: Medium    Left hand pain 07/03/2017     Priority: Medium       No family history on file.    No past surgical history on  file.     Social History     Socioeconomic History    Marital status:      Spouse name: Not on file    Number of children: Not on file    Years of education: Not on file    Highest education level: Not on file   Occupational History    Not on file   Tobacco Use    Smoking status: Never     Passive exposure: Never    Smokeless tobacco: Never    Tobacco comments:     Chew betel nuts    Vaping Use    Vaping Use: Never used   Substance and Sexual Activity    Alcohol use: Not on file    Drug use: Not on file    Sexual activity: Not on file   Other Topics Concern    Not on file   Social History Narrative    Not on file     Social Determinants of Health     Financial Resource Strain: Low Risk  (1/3/2024)    Financial Resource Strain     Within the past 12 months, have you or your family members you live with been unable to get utilities (heat, electricity) when it was really needed?: No   Food Insecurity: Low Risk  (1/3/2024)    Food Insecurity     Within the past 12 months, did you worry that your food would run out before you got money to buy more?: No     Within the past 12 months, did the food you bought just not last and you didn t have money to get more?: No   Transportation Needs: Low Risk  (1/3/2024)    Transportation Needs     Within the past 12 months, has lack of transportation kept you from medical appointments, getting your medicines, non-medical meetings or appointments, work, or from getting things that you need?: No   Physical Activity: Not on file   Stress: Not on file   Social Connections: Not on file   Interpersonal Safety: Low Risk  (1/3/2024)    Interpersonal Safety     Do you feel physically and emotionally safe where you currently live?: Yes     Within the past 12 months, have you been hit, slapped, kicked or otherwise physically hurt by someone?: No     Within the past 12 months, have you been humiliated or emotionally abused in other ways by your partner or ex-partner?: No   Housing  Stability: Low Risk  (1/3/2024)    Housing Stability     Do you have housing? : Yes     Are you worried about losing your housing?: No

## 2024-01-03 NOTE — PROGRESS NOTES
Clinic Care Coordination Contact  Community Health Worker Initial Outreach    CHW Initial Information Gathering:  Referral Source: Home Care  Preferred Hospital: [facility]  [phone]  Preferred Urgent Care: [facility], [phone]  Current living arrangement:: I live in a private home with family  Type of residence:: Apartment  Community Resources: None  Supplies Currently Used at Home: None  Equipment Currently Used at Home: none  Informal Support system:: Family  No PCP office visit in Past Year: No  Transportation means:: Family, Medical transport  CHW Additional Questions  If ED/Hospital discharge, follow-up appointment scheduled as recommended?: N/A  Medication changes made following ED/Hospital discharge?: N/A  MyChart active?: No  Patient agreeable to assistance with activating MyChart?: No    Patient accepts CC: Yes. Patient scheduled for assessment with CCC JEANNE on 1/08/2024 at 11:00 AM. Patient noted desire to discuss health insurance renewal and County benefits; rental assistant and cash assistant.     Financial Resource Worker Screening    County Benefits  Is patient requesting help applying for county benefits?: Yes  Have you recently applied for any county benefits?: No  How many people in your household?: 3  Do you buy/eat food together?: Yes  What is the monthly gross income for the household (wages, social security, workers comp, and pension)? : 0    [place]  [place] Yes  Is this an insurance renewal?: Yes  Is this a new insurance application request?: No    Any other information for the FRW?: Please call patient 2x as the first attempt to go into patient's voice mail.    Care Coordination team will tell patient:   Thank you for answering all the questions, based on screening questions, our Financial Resource Worker will reach out to you with additional questions and next steps.    [place] Department of Human  Services: [facility] Eligibility Response (271)  SUBSCRIBER INFORMATION   Date of Service Subscriber ID Subscriber Name Birthdate Age Gender   10/01/2023 [id number] WAY PAW 1970 53 FEMALE          Address   [id number] , [place]  [address]          PROVIDER INFORMATION   Provider ID Submitter Transaction ID Provider Name Taxonomy Code Qualifier Taxonomy Code   [id number] xx [facility]   This subscriber has eligibility for [place]: Medical Assistance.  Elig Type AA: Parent of a dependent child  Eligibility Begin Date: 08/01/2016  Eligibility End Date: 10/31/2023  This subscriber is eligible for the following service types: Medical Care ,  Chiropractic ,  Dental Care ,  Hospital ,  Hospital - Inpatient ,  Hospital - Outpatient ,  Emergency Services ,  Pharmacy ,  Professional (Physician) Visit - Office ,  Vision (Optometry) ,  Mental Health ,  Urgent Care   Prepaid Health Plan   This subscriber receives MA12 - Prepaid Medical Assistance Program (PMAP) delivered through [facility]. The phone numbers are: [phone] (metro) or [phone] (toll free).   Other Eligibility Information   No Special Transportation.  No Hospice.  Refer to Health Care Programs and Services Overview of the [facility] Provider Manual for a list of covered services.   Waivers   None     Subscriber Responsibility Information   An office visit copay of 3 dollars may exist for this subscriber.  A copay of 3.50 dollars for Non-Emergency ER visits may exist for this subscriber.   Restricted Recipient Program   None       Medicare   None

## 2024-01-03 NOTE — PATIENT INSTRUCTIONS
-Thank you for choosing the [facility].  -It was a pleasure to see you today.  -Please take a look at the information below for more specific details regarding the treatment plan and recommendations.  -In this after visit summary is a list of your medications and specific instructions.  Please review this carefully as there may be changes made to your medication list.  -If there are any particular questions or concerns, please feel free to reach out to Dr. Ibarra.  -If any labs have been completed, we will reach out to you about results.  If the results are normal or not concerning, a letter or [facility] message will be sent to you.  If any follow-up is needed, either Dr. Ibarra or the nurse will give you a call.  If you have not heard regarding results after 2 weeks, please reach out to the clinic.    Patient Instructions:    -Please go to lab and x-ray before you leave.   -Take the medications as prescribed.   -Further recommendations will be made once the labs and imaging are completed.   -Do the exercises for your neck and left wrist.     -You are due for a mammogram and pap smear. Please schedule a general physical to complete these.     Please seek immediate medical attention (go to the emergency room or urgent care) for the following reasons: worsening symptoms, or any concerning changes.      --------------------------------------------------------------------------------------------------------------------    -We are always looking for ways to improve.  You may be selected to receive a survey regarding your visit today.  We encourage you to complete the survey and provide specific, constructive feedback to help us improve our processes.  Thank you for your time!  -Please review the contact information listed on the after visit summary and in the electronic chart.  Below is the phone number that we have on file.  If there are any changes that are needed to be made, please reach out to the  [facility].  [phone] (home)

## 2024-01-03 NOTE — TELEPHONE ENCOUNTER
Please clarify with patient if she plans to establish care with [facility]. If so, please assist her schedule appt to establish care with a PCP when she comes to see Dr Ibarra today.     Jose Cruz will reach out to patient to schedule initial assessment with a clinician.    Thank you,

## 2024-01-03 NOTE — TELEPHONE ENCOUNTER
Pt needs help to get insurance renewed. Hers had  10/31/23. Would like a call to get help to reapply.

## 2024-01-03 NOTE — PROGRESS NOTES
Clinic Care Coordination Contact    Situation: Patient chart reviewed by care coordinator.    Background: care coordination received a message from clinic  staff as below:  Pt needs help to get insurance renewed. Hers had  10/31/23. Would like a call to get help to reapply.       Assessment: Care coordination episode created today. Per chart review, patient was established at [facility]. Patient is scheduled to see Dr Ibarra today at 3:10pm for heartburn and neckpain.CCRN reached out to  to clarify with patient when she comes in to see Dr Ibarra today if patient would like to establish care at [facility].If so, to assist patient schedule to establish care with a provider.     Plan/Recommendations: CHW to reach out to patient to schedule initial assessment with a clinician and complete FRW screening.

## 2024-01-04 ENCOUNTER — PATIENT OUTREACH (OUTPATIENT)
Dept: CARE COORDINATION | Facility: CLINIC | Age: 54
End: 2024-01-04

## 2024-01-04 ENCOUNTER — TELEPHONE (OUTPATIENT)
Dept: FAMILY MEDICINE | Facility: CLINIC | Age: 54
End: 2024-01-04

## 2024-01-04 DIAGNOSIS — R52 GENERALIZED BODY ACHES: ICD-10-CM

## 2024-01-04 DIAGNOSIS — E53.8 LOW FOLATE: ICD-10-CM

## 2024-01-04 DIAGNOSIS — E55.9 VITAMIN D DEFICIENCY: ICD-10-CM

## 2024-01-04 DIAGNOSIS — E53.8 FOLATE DEFICIENCY: Primary | ICD-10-CM

## 2024-01-04 LAB
VIT B12 SERPL-MCNC: 968 PG/ML (ref 232–1245)
VIT D+METAB SERPL-MCNC: 19 NG/ML (ref 20–50)

## 2024-01-04 PROCEDURE — 87338 HPYLORI STOOL AG IA: CPT | Performed by: FAMILY MEDICINE

## 2024-01-04 RX ORDER — CHOLECALCIFEROL (VITAMIN D3) 50 MCG
1 TABLET ORAL DAILY
Qty: 90 TABLET | Refills: 3 | Status: SHIPPED | OUTPATIENT
Start: 2024-01-04 | End: 2024-02-19

## 2024-01-04 RX ORDER — FOLIC ACID 1 MG/1
1 TABLET ORAL DAILY
Qty: 90 TABLET | Refills: 3 | Status: SHIPPED | OUTPATIENT
Start: 2024-01-04 | End: 2024-02-19

## 2024-01-04 NOTE — PROGRESS NOTES
Clinic Care Coordination Contact  Program: COUNTY BENEFITS / RENEWAL  County: [facility] Case #:  [facility] Worker:   Dwaine #:   Subscriber #:   Renewal:  Date Applied:     FRW Outreach:   23: CTA called Pt and went over screening questions and schedule appointment with FRW on  @ 9 am.   Brittany Penaloza  Care   [facility]  Clinic Care Coordination  [phone]    SNAP/CASH Application Screenin. Have you had [facility] benefits before? Yes   2. How many people in the household, do you eat/buy food together? 3  3. What is your monthly income (include all tax members)?  No Income  4. Do you have a bank account? Yes   5. Do you have any utility bills (electricity, rent, mortgage, phone, insurance, medical bills, etc.)?  Yes   6. Do you have social security cards and/or green cards?  Yes - Green card     Health Insurance Screening: DWAINE   1. Do you currently have health insurance, did you receive a renewal? No   2. [facility], do you know your username/password? No   3. How many people in the household? 3   4. Do you file taxes, who do you file with?  Yes - Don't know the name.  5. What is your monthly income (include all tax members)? No Income   6. Do you have access to insurance through an employer (if yes, need EIN)? NO   7. Do you have social security cards and/or green cards? Yes - Green Card  8. Is insurance active and when does it ? Inactive   Health Insurance:      Referral/Screening:  [facility] Benefits   Is patient requesting help applying for [facility] benefits? Yes       Have you recently applied for any [facility] benefits? No       How many people in your household? 3       Do you buy/eat food together? Yes       What is the monthly gross income for the household (wages, social security, workers comp, and pension)? 0       [place]:   [place] Yes       Is this an insurance renewal? Yes       Is this a new  insurance application request? No       OTHER   Is this a giovanni care application? No       Any other information for the FRW? Please call patient 2x as the first attempt to go into patient's voice mail.

## 2024-01-04 NOTE — TELEPHONE ENCOUNTER
Team - please call patient with results.    Eleanor Melton Tim,    I hope you have been well since our last visit. Below are the results from the testing completed at the visit.     Both low vitamin D and folic acid are low.  This finding can cause you to feel unwell and have pain.  Dr. Ibarra recommends that you take meant dictations to help improve the levels of the vitamin D and folic acid.  Once these levels are improved, it does still take some time (a number of months) before the rest of the body can catch up and feel better.  Please be sure to take these medications as prescribed.    The total cholesterol and LDL or bad cholesterol are high.  The triglycerides, which is primarily affected by food, is high. The HDL or good cholesterol are in the low range.  No medications are recommended at this time, though you should try to follow a diet that is rich in fruits and vegetables and low in fats and cholesterol.  In addition, find ways to stay active.  Doing aerobic activities (such as running, biking, etc.) will help improve the HDL or good cholesterol.  Weight loss is also recommended.    The rest of the blood testing is normal.    The x-ray of the bones of the neck appears to be good overall.  There are some arthritis type changes, though these are mild.  You can have some discomforts due to the arthritis.  Please be sure to do the exercises of the neck as given to you from clinic.    Once the stool test is completed, we will reach out to you about results.    Dr. Ibarra recommends that you continue on the plan as discussed in clinic.    If there are any questions or concerns, please call the clinic or schedule an appointment for follow up.     Best wishes,           Ross Ibarra MD  [facility]  1/4/2024  5:10 PM    The 10-year ASCVD risk score (Gretta STINSON, et al., 2019) is: 3.2%    Values used to calculate the score:      Age: 54 years      Sex: Female      Is Non- :  No      Diabetic: No      Tobacco smoker: No      Systolic Blood Pressure: 128 mmHg      Is BP treated: No      HDL Cholesterol: 45 mg/dL      Total Cholesterol: 245 mg/dL    Diagnoses and all orders for this visit:    Folate deficiency  -     folic acid (FOLVITE) 1 MG tablet; Take 1 tablet (1 mg) by mouth daily    Vitamin D deficiency  -     vitamin D3 (CHOLECALCIFEROL) 50 mcg (2000 units) tablet; Take 1 tablet (50 mcg) by mouth daily    Low folate  -     folic acid (FOLVITE) 1 MG tablet; Take 1 tablet (1 mg) by mouth daily    Generalized body aches  -     vitamin D3 (CHOLECALCIFEROL) 50 mcg (2000 units) tablet; Take 1 tablet (50 mcg) by mouth daily  -     folic acid (FOLVITE) 1 MG tablet; Take 1 tablet (1 mg) by mouth daily

## 2024-01-05 PROBLEM — G56.02 CARPAL TUNNEL SYNDROME OF LEFT WRIST: Status: ACTIVE | Noted: 2024-01-05

## 2024-01-05 PROBLEM — K29.50 CHRONIC GASTRITIS WITHOUT BLEEDING, UNSPECIFIED GASTRITIS TYPE: Status: ACTIVE | Noted: 2024-01-05

## 2024-01-05 PROBLEM — R52 GENERALIZED BODY ACHES: Status: ACTIVE | Noted: 2024-01-05

## 2024-01-05 PROBLEM — M48.02 CERVICAL STENOSIS OF SPINAL CANAL: Status: ACTIVE | Noted: 2024-01-05

## 2024-01-05 LAB — H PYLORI AG STL QL IA: NEGATIVE

## 2024-01-08 ENCOUNTER — PATIENT OUTREACH (OUTPATIENT)
Dept: FAMILY MEDICINE | Facility: CLINIC | Age: 54
End: 2024-01-08

## 2024-01-08 ASSESSMENT — ACTIVITIES OF DAILY LIVING (ADL): DEPENDENT_IADLS:: INDEPENDENT

## 2024-01-08 NOTE — LETTER
[facility]  Patient Centered Plan of Care  About Me:        Patient Name:  Geronimo Dumont    YOB: 1970  Age:         54 year old   Ally MRN:    [id number] Telephone Information:  Home Phone [phone]   Mobile [phone]   Mobile [phone]       Address:  [address]  [place] [address] Email address:  [email or web address]      Emergency Contact(s)    Name Relationship Lgl Grd Work Phone Home Phone Mobile Phone   1. KENYON GUNN Other   [phone]            Primary language:  Michelle     needed? Yes   [employer]:  [phone] op. 1  Other communication barriers:Language barrier    Preferred Method of Communication:  Phone   Current living arrangement: I live in a private home with family    Mobility Status/ Medical Equipment: Independent        Health Maintenance  Health Maintenance Reviewed: Due/Overdue   Health Maintenance Due   Topic Date Due    YEARLY PREVENTIVE VISIT  Never done    ADVANCE CARE PLANNING  Never done    HEPATITIS B IMMUNIZATION (1 of 3 - 3-dose series) Never done    COVID-19 Vaccine (1) Never done    PAP  Never done    ZOSTER IMMUNIZATION (1 of 2) Never done    MAMMO SCREENING  07/02/2020    INFLUENZA VACCINE (1) 09/01/2023           My Access Plan  Medical Emergency 911   Primary Clinic Line [facility] [phone]   24 Hour Appointment Line [phone] or  [phone]) (toll-free)   24 Hour Nurse Line [phone] (toll-free)   Preferred Urgent Care [facility] [phone]     Preferred Hospital [facility]  [phone]     Preferred Pharmacy [id number] - [place], [place] - [address]     Behavioral Health Crisis Line The National Suicide Prevention Lifeline at 1-967.925.1116 or Text/Call 748           My Care Team Members  Patient Care Team         Relationship Specialty Notifications Start  End    Ross Ibarra MD PCP - General Family Medicine  1/3/24     Phone: [phone] Fax: [phone]         [address] [address]    Essence Chavez MD Assigned PCP   7/2/22     Phone: [phone] Fax: [phone]         [address] [facility] [address]    Jose Cruz Orosco CHW Community Health Worker Primary Care - CC Admissions 1/3/24     Phone: [phone] Fax: [phone]         [address] [place] [address]    Priyanka Hoff, JENNIESW Lead Care Coordinator  Admissions 1/3/24 1/8/24    Suzette Pruitt MA Financial Resource Worker   1/4/24     Phone: [phone]         Caitlyn Torres MSW Lead Care Coordinator  Admissions 1/8/24                 My Care Plans  Self Management and Treatment Plan    Care Plan  Care Plan: Financial Wellbeing       Problem: Patient expresses financial resource strain       Goal: Create an action plan to increase financial stability       Start Date: 1/8/2024 Expected End Date: 4/8/2024    This Visit's Progress: 20%    Note:     Barriers: income   Strengths: motivated   Patient expressed understanding of goal: Yes  Action steps to achieve this goal:  1. I will engage with FRW to apply for county benefits in next 1-2 weeks.   2. I will engage with CC monthly and request additional support as needed.                                 Care Plan: Health Maintenance       Problem: Health Maintenance Due or Overdue       Goal: Become up-to-date with health maintenance visit(s)       Start Date: 1/8/2024    This Visit's Progress: 50%    Note:     Goal Statement: I will complete my annual wellness visit.    Barriers: Insurance   Strengths: Motivated     Patient expressed understanding of goal: yes  Action steps to achieve this goal:  1. I will attend scheduled  annual wellness visit in next month  2. I will contact my Care Management or clinic team if I have barriers to attending my annual wellness visit.                                Action Plans on  File:                       Advance Care Plans/Directives:   Advanced Care Plan/Directives on file:   No    Discussed with patient/caregiver(s): No data recorded           My Medical and Care Information  Problem List   Patient Active Problem List   Diagnosis    Left hand pain    Vitamin D deficiency    Low folate    Chronic gastritis without bleeding, unspecified gastritis type    Cervical stenosis of spinal canal    Generalized body aches    Carpal tunnel syndrome of left wrist      Current Medications and Allergies:  See printed Medication Report.    Care Coordination Start Date: 1/3/2024   Frequency of Care Coordination: monthly, more frequently as needed     Form Last Updated: 01/08/2024

## 2024-01-08 NOTE — PROGRESS NOTES
Clinic Care Coordination Contact  Clinic Care Coordination Contact  OUTREACH    Referral Information:  Referral Source: Home Care    Primary Diagnosis: Psychosocial    Patient is 54 year old woman who resides with her children. Patient is looking for assistance with renewing her MA, applying for cash and SNAP assistance. Patient shared she no longer able to work due to her health. Discussed referral for FRW and upcoming appointment to assist with [facility] benefits. Patient noted she aware of tomorrow appointment with FRW to assist with [facility] benefits. No other needs identified at this time.     Addendum 01/10 at 11am   Received  call from back patient requesting a call back. Returned patient call using language line . No answer and unable to leave VM at . Upon chart view writer notice patient missed FRW appointment yesterday. Writer left a message with different number on file and requested for pt to call FRW back to apply for [facility] benefits. Writer requested a call back with questions or concerns.     Addendum 01/10 at 330 pm  Received call from patient noted she waiting someone help with county paper work. Writer informed patient FRW called her yesterday and provided contact number. Patient requested writer to have FRW. Writer message FRW and requested for to call patient as patient shared her preference to be called in morning.     Chief Complaint   Patient presents with    Clinic Care Coordination - Initial        Universal Utilization: appropriate   Clinic Utilization  Difficulty keeping appointments:: No  Compliance Concerns: No  No-Show Concerns: No  No PCP office visit in Past Year: No  Utilization      No Show Count (past year)  2             ED Visits  0             Hospital Admissions  0                    Current as of: 1/8/2024 12:04 PM                Clinical Concerns:  Current Medical Concerns:    Current Outpatient Medications   Medication    acetaminophen (TYLENOL) 500 MG  tablet    baclofen (LIORESAL) 10 MG tablet    famotidine (PEPCID) 40 MG tablet    folic acid (FOLVITE) 1 MG tablet    vitamin D3 (CHOLECALCIFEROL) 50 mcg (2000 units) tablet     No current facility-administered medications for this visit.         Current Behavioral Concerns: Denies at this time.     Education Provided to patient: Discussed county benefits.    Pain  Pain (GOAL):: No  Health Maintenance Reviewed: Due/Overdue   Health Maintenance Due   Topic Date Due    YEARLY PREVENTIVE VISIT  Never done    ADVANCE CARE PLANNING  Never done    HEPATITIS B IMMUNIZATION (1 of 3 - 3-dose series) Never done    COVID-19 Vaccine (1) Never done    PAP  Never done    ZOSTER IMMUNIZATION (1 of 2) Never done    MAMMO SCREENING  07/02/2020    INFLUENZA VACCINE (1) 09/01/2023       Clinical Pathway: None    Medication Management:  Medication review status: Medications reviewed and no changes reported per patient.           Functional Status:  Dependent ADLs:: Independent  Dependent IADLs:: Independent  Bed or wheelchair confined:: No  Mobility Status: Independent  Fallen 2 or more times in the past year?: No  Any fall with injury in the past year?: No    Living Situation:  Current living arrangement:: I live in a private home with family  Type of residence:: Apartment    Lifestyle & Psychosocial Needs:    Social Determinants of Health     Food Insecurity: Low Risk  (1/3/2024)    Food Insecurity     Within the past 12 months, did you worry that your food would run out before you got money to buy more?: No     Within the past 12 months, did the food you bought just not last and you didn t have money to get more?: No   Depression: Not at risk (1/3/2024)    PHQ-2     PHQ-2 Score: 1   Housing Stability: Low Risk  (1/3/2024)    Housing Stability     Do you have housing? : Yes     Are you worried about losing your housing?: No   Tobacco Use: Low Risk  (1/3/2024)    Patient History     Smoking Tobacco Use: Never     Smokeless Tobacco  Use: Never     Passive Exposure: Never   Financial Resource Strain: Low Risk  (1/3/2024)    Financial Resource Strain     Within the past 12 months, have you or your family members you live with been unable to get utilities (heat, electricity) when it was really needed?: No   Alcohol Use: Not on file   Transportation Needs: Low Risk  (1/3/2024)    Transportation Needs     Within the past 12 months, has lack of transportation kept you from medical appointments, getting your medicines, non-medical meetings or appointments, work, or from getting things that you need?: No   Physical Activity: Not on file   Interpersonal Safety: Low Risk  (1/3/2024)    Interpersonal Safety     Do you feel physically and emotionally safe where you currently live?: Yes     Within the past 12 months, have you been hit, slapped, kicked or otherwise physically hurt by someone?: No     Within the past 12 months, have you been humiliated or emotionally abused in other ways by your partner or ex-partner?: No   Stress: Not on file   Social Connections: Not on file     Diet:: Regular  Inadequate nutrition (GOAL):: No  Tube Feeding: No  Inadequate activity/exercise (GOAL):: No  Significant changes in sleep pattern (GOAL): No  Transportation means:: Family, Medical transport     Episcopalian or spiritual beliefs that impact treatment:: No  Mental health DX:: No  Mental health management concern (GOAL):: No  Chemical Dependency Status: No Current Concerns  Informal Support system:: Family      Resources and Interventions:  Current Resources:      Community Resources: None  Supplies Currently Used at Home: None  Equipment Currently Used at Home: none  Employment Status: unemployed    Advance Care Plan/Directive  Advanced Care Plans/Directives on file:: No    Referrals Placed: None     Care Plan:  Care Plan: Financial Wellbeing       Problem: Patient expresses financial resource strain       Goal: Create an action plan to increase financial stability        Start Date: 1/8/2024 Expected End Date: 4/8/2024    This Visit's Progress: 20%    Note:     Barriers: income   Strengths: motivated   Patient expressed understanding of goal: Yes  Action steps to achieve this goal:  1. I will engage with FRW to apply for [facility] benefits in next 1-2 weeks.   2. I will engage with CC monthly and request additional support as needed.                                 Care Plan: Health Maintenance       Problem: Health Maintenance Due or Overdue       Goal: Become up-to-date with health maintenance visit(s)       Start Date: 1/8/2024    This Visit's Progress: 50%    Note:     Goal Statement: I will complete my annual wellness visit.    Barriers: Insurance   Strengths: Motivated     Patient expressed understanding of goal: yes  Action steps to achieve this goal:  1. I will attend scheduled  annual wellness visit in next month  2. I will contact my Care Management or clinic team if I have barriers to attending my annual wellness visit.                                Patient/Caregiver understanding: Pt reports understanding and denies any additional questions or concerns at this times. SW CC engaged in AIDET communication during encounter.      Outreach Frequency: monthly, more frequently as needed  Future Appointments                Tomorrow Suzette Pruitt MA; PHONE,  [facility] Care Coordination, [facility]    In 1 month Ross Ibarra MD [facility] [place] SPRO    In 2 months Jacqueline Booth MD [facility] [place] SPRO            Plan: Patient will engage with FRW for applying [facility] benefits in next month and attend upcoming PCP appointment.     Amber Torres [facility]  Social Work Care Cooridinator   [facility]   Phone: [phone]

## 2024-01-08 NOTE — LETTER
[facility] CARE COORDINATION  [address] [address]    January 8, 2024    Geronimo Paw  [address]  [place] [address]      Dear Way,    I am a clinic care coordinator who works with Ross Ibarra MD with the [facility]. I wanted to thank you for spending the time to talk with me.  Below is a description of clinic care coordination and how I can further assist you.       The clinic care coordination team is made up of a registered nurse, , financial resource worker and community health worker who understand the health care system. The goal of clinic care coordination is to help you manage your health and improve access to the health care system. Our team works alongside your provider to assist you in determining your health and social needs. We can help you obtain health care and community resources, providing you with necessary information and education. We can work with you through any barriers and develop a care plan that helps coordinate and strengthen the communication between you and your care team.  Our services are voluntary and are offered without charge to you personally.    Please feel free to contact me with any questions or concerns regarding care coordination and what we can offer.      We are focused on providing you with the highest-quality healthcare experience possible.    Sincerely,     Amber Torres, [facility]  Social Work Care Cooridinator   [facility]   Phone: [phone]

## 2024-01-09 ENCOUNTER — PATIENT OUTREACH (OUTPATIENT)
Dept: CARE COORDINATION | Facility: CLINIC | Age: 54
End: 2024-01-09

## 2024-01-09 NOTE — PROGRESS NOTES
Clinic Care Coordination Contact  Program: COUNTY BENEFITS / RENEWAL  County: [facility] Case #:  [facility] Worker:   Dwaine #:   Subscriber #:   Renewal:  Date Applied:     FRW Outreach:   24 FRW called with @ different  and left a vm with call back information. FRW will make outreach in one week.  Suzette Pruitt   Financial Resource Worker  [facility] Care Coordination  [phone]    23: CTA called Pt and went over screening questions and schedule appointment with FRW on  @ 9 am.   Brittany Penaloza  Care   [facility] Care Coordination  [phone]    SNAP/CASH Application Screenin. Have you had [facility] benefits before? Yes   2. How many people in the household, do you eat/buy food together? 3  3. What is your monthly income (include all tax members)?  No Income  4. Do you have a bank account? Yes   5. Do you have any utility bills (electricity, rent, mortgage, phone, insurance, medical bills, etc.)?  Yes   6. Do you have social security cards and/or green cards?  Yes - Green card     Health Insurance Screening: [place]   1. Do you currently have health insurance, did you receive a renewal? No   2. If you applied through [employer], do you know your username/password? No   3. How many people in the household? 3   4. Do you file taxes, who do you file with?  Yes - Don't know the name.  5. What is your monthly income (include all tax members)? No Income   6. Do you have access to insurance through an employer (if yes, need EIN)? NO   7. Do you have social security cards and/or green cards? Yes - Green Card  8. Is insurance active and when does it ? Inactive   Health Insurance:      Referral/Screening:  [facility] Benefits   Is patient requesting help applying for [facility] benefits? Yes       Have you recently applied for any [facility] benefits? No       How many people in your household? 3       Do you buy/eat food together? Yes        What is the monthly gross income for the household (wages, social security, workers comp, and pension)? 0       Insurance:   [place]? Yes       Is this an insurance renewal? Yes       Is this a new insurance application request? No       OTHER   Is this a giovanni care application? No       Any other information for the FRW? Please call patient 2x as the first attempt to go into patient's voice mail.

## 2024-01-20 ENCOUNTER — PATIENT OUTREACH (OUTPATIENT)
Dept: CARE COORDINATION | Facility: CLINIC | Age: 54
End: 2024-01-20

## 2024-01-23 ENCOUNTER — PATIENT OUTREACH (OUTPATIENT)
Dept: CARE COORDINATION | Facility: CLINIC | Age: 54
End: 2024-01-23

## 2024-02-14 ENCOUNTER — PATIENT OUTREACH (OUTPATIENT)
Dept: CARE COORDINATION | Facility: CLINIC | Age: 54
End: 2024-02-14
Payer: MEDICAID

## 2024-02-14 NOTE — PROGRESS NOTES
Clinic Care Coordination Contact  Community Health Worker Follow Up    Care Gaps:   Health Maintenance Due   Topic Date Due    YEARLY PREVENTIVE VISIT  Never done    ADVANCE CARE PLANNING  Never done    HEPATITIS B IMMUNIZATION (1 of 3 - 3-dose series) Never done    COVID-19 Vaccine (1) Never done    PAP  Never done    ZOSTER IMMUNIZATION (1 of 2) Never done    MAMMO SCREENING  07/02/2020    INFLUENZA VACCINE (1) 09/01/2023     Scheduled 3/08/2024 for preventive care visit.      Care Plan:   Care Plan: Financial Wellbeing       Problem: Patient expresses financial resource strain                   Care Plan: Health Maintenance       Problem: Health Maintenance Due or Overdue       Goal: Become up-to-date with health maintenance visit(s)       Start Date: 1/8/2024    This Visit's Progress: 60% Recent Progress: 50%    Note:     Barriers: Insurance   Strengths: Motivated   Patient expressed understanding of goal: yes    Action steps to achieve this goal:  1. I will attend scheduled  annual wellness visit in next month  2. I will contact my Care Management or clinic team if I have barriers to attending my annual wellness visit.                            Intervention and Education during outreach:  -Per patient, spouse started working 3 weeks ago and one of her children who is in college also has income but waiting for health insurance.   -CHW reminded patient of upcoming appointments.  -Patient was informed to call with questions or concerns.     CHW Next Outreach: In one month.

## 2024-02-19 ENCOUNTER — OFFICE VISIT (OUTPATIENT)
Dept: FAMILY MEDICINE | Facility: CLINIC | Age: 54
End: 2024-02-19
Payer: MEDICAID

## 2024-02-19 ENCOUNTER — PATIENT OUTREACH (OUTPATIENT)
Dept: CARE COORDINATION | Facility: CLINIC | Age: 54
End: 2024-02-19

## 2024-02-19 VITALS
SYSTOLIC BLOOD PRESSURE: 97 MMHG | DIASTOLIC BLOOD PRESSURE: 62 MMHG | RESPIRATION RATE: 16 BRPM | TEMPERATURE: 98.2 F | HEART RATE: 73 BPM | WEIGHT: 122 LBS | HEIGHT: 56 IN | OXYGEN SATURATION: 100 % | BODY MASS INDEX: 27.44 KG/M2

## 2024-02-19 DIAGNOSIS — M48.02 CERVICAL STENOSIS OF SPINAL CANAL: Primary | ICD-10-CM

## 2024-02-19 DIAGNOSIS — E53.8 FOLATE DEFICIENCY: ICD-10-CM

## 2024-02-19 DIAGNOSIS — R52 GENERALIZED BODY ACHES: ICD-10-CM

## 2024-02-19 DIAGNOSIS — E55.9 VITAMIN D DEFICIENCY: ICD-10-CM

## 2024-02-19 DIAGNOSIS — E53.8 LOW FOLATE: ICD-10-CM

## 2024-02-19 DIAGNOSIS — M54.2 NECK PAIN: ICD-10-CM

## 2024-02-19 DIAGNOSIS — K29.50 CHRONIC GASTRITIS WITHOUT BLEEDING, UNSPECIFIED GASTRITIS TYPE: ICD-10-CM

## 2024-02-19 PROCEDURE — 99214 OFFICE O/P EST MOD 30 MIN: CPT | Performed by: FAMILY MEDICINE

## 2024-02-19 RX ORDER — FAMOTIDINE 40 MG/1
40 TABLET, FILM COATED ORAL
Qty: 90 TABLET | Refills: 3 | Status: SHIPPED | OUTPATIENT
Start: 2024-02-19

## 2024-02-19 RX ORDER — BACLOFEN 10 MG/1
10 TABLET ORAL 3 TIMES DAILY PRN
Qty: 100 TABLET | Refills: 11 | Status: SHIPPED | OUTPATIENT
Start: 2024-02-19

## 2024-02-19 RX ORDER — FOLIC ACID 1 MG/1
1 TABLET ORAL DAILY
Qty: 90 TABLET | Refills: 3 | Status: SHIPPED | OUTPATIENT
Start: 2024-02-19

## 2024-02-19 RX ORDER — ACETAMINOPHEN 500 MG
1000 TABLET ORAL EVERY 8 HOURS PRN
Qty: 100 TABLET | Refills: 11 | Status: SHIPPED | OUTPATIENT
Start: 2024-02-19

## 2024-02-19 RX ORDER — CHOLECALCIFEROL (VITAMIN D3) 50 MCG
1 TABLET ORAL DAILY
Qty: 90 TABLET | Refills: 3 | Status: SHIPPED | OUTPATIENT
Start: 2024-02-19

## 2024-02-19 NOTE — Clinical Note
Eleanor. I saw Ms. Dumont on 02/19/2024. She wanted you to reach back out to her to talk more about the insurance. Please give her a call when you are able. Thank you! Ross Ibarra MD

## 2024-02-19 NOTE — PATIENT INSTRUCTIONS
-Thank you for choosing the [facility].  -It was a pleasure to see you today.  -Please take a look at the information below for more specific details regarding the treatment plan and recommendations.  -In this after visit summary is a list of your medications and specific instructions.  Please review this carefully as there may be changes made to your medication list.  -If there are any particular questions or concerns, please feel free to reach out to Dr. Ibarra.  -If any labs have been completed, we will reach out to you about results.  If the results are normal or not concerning, a letter or [facility] message will be sent to you.  If any follow-up is needed, either Dr. Ibarra or the nurse will give you a call.  If you have not heard regarding results after 2 weeks, please reach out to the clinic.    Patient Instructions:    -Take the medications as prescribed.  -If you have troubles getting the medications, please reach out to Dr. Ibarra.     -The preventive measures below are recommended for you.  Please let Dr. Ibarra know when you are ready to complete them.    Health Maintenance Due   Topic Date Due    YEARLY PREVENTIVE VISIT  Never done    ADVANCE CARE PLANNING  Never done    HEPATITIS B IMMUNIZATION (1 of 3 - 3-dose series) Never done    COVID-19 Vaccine (1) Never done    PAP  Never done    ZOSTER IMMUNIZATION (1 of 2) Never done    MAMMO SCREENING  07/02/2020    INFLUENZA VACCINE (1) 09/01/2023       Please seek immediate medical attention (go to the emergency room or urgent care) for the following reasons: worsening symptoms, or any concerning changes.      --------------------------------------------------------------------------------------------------------------------    -We are always looking for ways to improve.  You may be selected to receive a survey regarding your visit today.  We encourage you to complete the survey and provide specific, constructive feedback to help us improve our  processes.  Thank you for your time!  -Please review the contact information listed on the after visit summary and in the electronic chart.  Below is the phone number that we have on file.  If there are any changes that are needed to be made, please reach out to the clinic.  [phone] (home)

## 2024-02-19 NOTE — PROGRESS NOTES
OFFICE VISIT    Assessment/Plan:     Patient Instructions:    -Take the medications as prescribed.  -If you have troubles getting the medications, please reach out to Dr. Ibarra.     -The preventive measures below are recommended for you.  Please let Dr. Ibarra know when you are ready to complete them.    Health Maintenance Due   Topic Date Due    YEARLY PREVENTIVE VISIT  Never done    ADVANCE CARE PLANNING  Never done    HEPATITIS B IMMUNIZATION (1 of 3 - 3-dose series) Never done    COVID-19 Vaccine (1) Never done    PAP  Never done    ZOSTER IMMUNIZATION (1 of 2) Never done    MAMMO SCREENING  07/02/2020    INFLUENZA VACCINE (1) 09/01/2023       Please seek immediate medical attention (go to the emergency room or urgent care) for the following reasons: worsening symptoms, or any concerning changes.      Way was seen today for establish care.  Diagnoses and all orders for this visit:    Cervical stenosis of spinal canal  Generalized body aches  Neck pain  Folate deficiency  Low folate  Vitamin D deficiency: Patient endorsing significant improvement in symptoms since starting the medication.  Refill given as below.  Patient to continue on medications and if symptoms not improving, she will return to clinic.  Reviewed that there are additional refills available on the medications.  She expresses understanding and will  for medications as needed.  -     acetaminophen (TYLENOL) 500 MG tablet; Take 2 tablets (1,000 mg) by mouth every 8 hours as needed for pain or fever  -     baclofen (LIORESAL) 10 MG tablet; Take 1 tablet (10 mg) by mouth 3 times daily as needed for muscle spasms  -     folic acid (FOLVITE) 1 MG tablet; Take 1 tablet (1 mg) by mouth daily  -     vitamin D3 (CHOLECALCIFEROL) 50 mcg (2000 units) tablet; Take 1 tablet (50 mcg) by mouth daily    Chronic gastritis without bleeding, unspecified gastritis type: Patient endorsing significant improvement in symptoms since starting the medication.  Refill  given as below.  -     famotidine (PEPCID) 40 MG tablet; Take 1 tablet (40 mg) by mouth nightly as needed for heartburn    Visit for screening mammogram: Patient declines at this time.        Return in about 3 months (around 5/19/2024) for Medication follow up.    The diagnoses, treatment options, risk, benefits, and recommendations were reviewed with patient/guardian.  Questions were answered to patient's/guardian satisfaction.  Red flag signs were reviewed.  Patient/guardian is in agreement with above plan.      Subjective: 54 year old female with history of  gastritis, folate and vitamin D deficiency, constipation, cervical stenosis of spinal canal (C3-C5) who presents to clinic for the following complaints:   Patient presents with:  Establish Care: Follow up abdominal pain and neck pain. She was not able to refill Rx anymore    Patient was seen by this provider on 01/03/2024.  Patient had prescribed the following medications: Acetaminophen, baclofen, famotidine, folic acid, vitamin D.  Refills were included on the medications, though patient states that she cannot refill the prescriptions anymore.    Patient states that the pharmacy told her that they could not find anything in the system. She did get the initial prescription for the medications. Discussed that on our end, there should be prescriptions present.     When she was on the medications, the medication seemed to be really helpful for the underlying conditions. She took one month's worth of the medications of each of the ones prescribed from 1/3 and 1/4. No worsening of underlying conditions noted. Symptoms are overall a lot better, though the pain is still there.     She did speak with Jose Cruz ([email or web address]) at [facility] who is working with patient on the insurance. Patient would like Jose Cruz to give her a call regarding the insurance.  Message sent to Jose Cruz ([email or web address]).    A professional Michelle telephone  was utilized for the office visit.     The 10 point  "review of system is negative except as stated in the HPI.    Allergies were reviewed and updated.    HM due was reviewed with patient/parent.  Recommendations, risk, benefits were reviewed.  Accepted recommendations were ordered.  Otherwise, patient/parent declined.    Health Maintenance Due   Topic Date Due    YEARLY PREVENTIVE VISIT  Never done    ADVANCE CARE PLANNING  Never done    HEPATITIS B IMMUNIZATION (1 of 3 - 3-dose series) Never done    COVID-19 Vaccine (1) Never done    PAP  Never done    ZOSTER IMMUNIZATION (1 of 2) Never done    MAMMO SCREENING  07/02/2020    INFLUENZA VACCINE (1) 09/01/2023     Patient does not currently have medical insurance, so she declines. Offered referral to care coordinators if needed.     Immunization History   Administered Date(s) Administered    Influenza Vaccine >6 months,quad, PF 12/04/2017    Influenza Vaccine, 6+MO IM (QUADRIVALENT W/PRESERVATIVES) 10/05/2016    TDAP (Adacel,Boostrix) 01/11/2017         Objective:   BP 97/62   Pulse 73   Temp 98.2  F (36.8  C) (Oral)   Resp 16   Ht 1.416 m (4' 7.75\")   Wt 55.3 kg (122 lb)   SpO2 100%   BMI 27.60 kg/m    General: Active, alert, nontoxic-appearing.  No acute distress.  HEENT: Normocephalic, atraumatic.  Pupils are equal and round.  Sclera is clear.  Normal external ears. Nares patent.  Moist mucous membranes.    Cardiac: RRR.  S1, S2 present.  No murmurs, rubs, or gallops.  Respiratory/chest: Clear to auscultation bilaterally.  No wheezes, rales, rhonchi.  Breathing is not labored.  No accessory muscle usage.  Abdomen: epigastric discomforts noted to palpation. Soft, nondistended, nontender.  No masses or organomegaly noted.  No guarding or rebound tenderness appreciated.  Extremities: Voluntary movements intact.  Integumentary: No concerning rash or skin changes appreciated.      Ross Ibarra MD  [facility] [place] [address]  Phone: [phone]  Fax: [phone]    2/20/2024  " 2:31 PM          Current Outpatient Medications   Medication    acetaminophen (TYLENOL) 500 MG tablet    baclofen (LIORESAL) 10 MG tablet    famotidine (PEPCID) 40 MG tablet    folic acid (FOLVITE) 1 MG tablet    vitamin D3 (CHOLECALCIFEROL) 50 mcg (2000 units) tablet     No current facility-administered medications for this visit.       Allergies   Allergen Reactions    Methylprednisolone Itching     Depo-medrol trigger finger injection        Patient Active Problem List    Diagnosis Date Noted    Chronic gastritis without bleeding, unspecified gastritis type 01/05/2024     Priority: Medium    Cervical stenosis of spinal canal 01/05/2024     Priority: Medium    Generalized body aches 01/05/2024     Priority: Medium    Carpal tunnel syndrome of left wrist 01/05/2024     Priority: Medium    Vitamin D deficiency 12/15/2017     Priority: Medium    Low folate 12/05/2017     Priority: Medium    Left hand pain 07/03/2017     Priority: Medium       No family history on file.    No past surgical history on file.     Social History     Socioeconomic History    Marital status:      Spouse name: Not on file    Number of children: Not on file    Years of education: Not on file    Highest education level: Not on file   Occupational History    Not on file   Tobacco Use    Smoking status: Never     Passive exposure: Never    Smokeless tobacco: Never    Tobacco comments:     Chew betel nuts    Vaping Use    Vaping Use: Never used   Substance and Sexual Activity    Alcohol use: Not on file    Drug use: Not on file    Sexual activity: Not on file   Other Topics Concern    Not on file   Social History Narrative    Not on file     Social Determinants of Health     Financial Resource Strain: Low Risk  (1/3/2024)    Financial Resource Strain     Within the past 12 months, have you or your family members you live with been unable to get utilities (heat, electricity) when it was really needed?: No   Food Insecurity: Low Risk   (1/3/2024)    Food Insecurity     Within the past 12 months, did you worry that your food would run out before you got money to buy more?: No     Within the past 12 months, did the food you bought just not last and you didn t have money to get more?: No   Transportation Needs: Low Risk  (1/3/2024)    Transportation Needs     Within the past 12 months, has lack of transportation kept you from medical appointments, getting your medicines, non-medical meetings or appointments, work, or from getting things that you need?: No   Physical Activity: Not on file   Stress: Not on file   Social Connections: Not on file   Interpersonal Safety: Low Risk  (1/3/2024)    Interpersonal Safety     Do you feel physically and emotionally safe where you currently live?: Yes     Within the past 12 months, have you been hit, slapped, kicked or otherwise physically hurt by someone?: No     Within the past 12 months, have you been humiliated or emotionally abused in other ways by your partner or ex-partner?: No   Housing Stability: Low Risk  (1/3/2024)    Housing Stability     Do you have housing? : Yes     Are you worried about losing your housing?: No

## 2024-02-19 NOTE — PROGRESS NOTES
Care Coordination Clinician Chart Review    Situation: Patient chart reviewed by Care Coordinator.       Background: Care Coordination Program started: 1/3/2024. Initial assessment completed and patient-centered care plan(s) were developed with participation from patient. Lead CC handed patient off to CHW for continued outreaches.       Assessment: Per chart review, patient outreach completed by CC CHW on 02/14/2024.  Patient is actively working to accomplish goal(s). Patient's goal(s) appropriate and relevant at this time. Patient is not due for updated Plan of Care.  Assessments will be completed annually or as needed/with change of patient status.      Care Plan: Financial Wellbeing       Problem: Patient expresses financial resource strain                   Care Plan: Health Maintenance       Problem: Health Maintenance Due or Overdue       Goal: Become up-to-date with health maintenance visit(s)       Start Date: 1/8/2024    This Visit's Progress: 60% Recent Progress: 50%    Note:     Barriers: Insurance   Strengths: Motivated   Patient expressed understanding of goal: yes    Action steps to achieve this goal:  1. I will attend scheduled  annual wellness visit in next month  2. I will contact my Care Management or clinic team if I have barriers to attending my annual wellness visit.                                   Plan/Recommendations: The patient will continue working with Care Coordination to achieve goal(s) as above. CHW will continue outreaches at minimum every 30 days and will involve Lead CC as needed or if patient is ready to move to Maintenance. Lead CC will continue to monitor CHW outreaches and patient's progress to goal(s) every 6 weeks.     Plan of Care updated and sent to patient: Maureen Torres [facility]  [facility]   Phone: [phone]

## 2024-02-20 NOTE — PROGRESS NOTES
Thank you for reaching out, she has an assigned FRW who is assisting her with insurance and they reaching out to her again.

## 2024-02-21 ENCOUNTER — TELEPHONE (OUTPATIENT)
Dept: MAMMOGRAPHY | Facility: CLINIC | Age: 54
End: 2024-02-21
Payer: MEDICAID

## 2024-02-21 NOTE — TELEPHONE ENCOUNTER
Patient Quality Outreach    Patient is due for the following:   Breast Cancer Screening - Mammogram    Next Steps:   Patient was scheduled for mammo    Type of outreach:    Phone, spoke to patient/parent. 3/8 at 9:15    Next Steps:  Reach out within 90 days via Phone.    Max number of attempts reached: No. Will try again in 90 days if patient still on fail list.    Questions for provider review:    None           GABRIELA Gonzalez  Chart routed to Care Team.

## 2024-02-29 ENCOUNTER — PATIENT OUTREACH (OUTPATIENT)
Dept: CARE COORDINATION | Facility: CLINIC | Age: 54
End: 2024-02-29
Payer: MEDICAID

## 2024-03-12 NOTE — TELEPHONE ENCOUNTER
Patient stop by the clinic and requested a call from FRW regarding renewal status for insurance. Pt confirmed contact info still current.

## 2024-03-14 ENCOUNTER — HOSPITAL ENCOUNTER (EMERGENCY)
Facility: HOSPITAL | Age: 54
Discharge: HOME | End: 2024-03-15
Attending: STUDENT IN AN ORGANIZED HEALTH CARE EDUCATION/TRAINING PROGRAM | Admitting: STUDENT IN AN ORGANIZED HEALTH CARE EDUCATION/TRAINING PROGRAM
Payer: MEDICAID

## 2024-03-14 ENCOUNTER — APPOINTMENT (OUTPATIENT)
Dept: CT IMAGING | Facility: HOSPITAL | Age: 54
End: 2024-03-14
Attending: STUDENT IN AN ORGANIZED HEALTH CARE EDUCATION/TRAINING PROGRAM
Payer: MEDICAID

## 2024-03-14 DIAGNOSIS — N23 RENAL COLIC: ICD-10-CM

## 2024-03-14 LAB
ALBUMIN SERPL BCG-MCNC: 4.2 G/DL (ref 3.5–5.2)
ALP SERPL-CCNC: 82 U/L (ref 40–150)
ALT SERPL W P-5'-P-CCNC: 32 U/L (ref 0–50)
ANION GAP SERPL CALCULATED.3IONS-SCNC: 12 MMOL/L (ref 7–15)
AST SERPL W P-5'-P-CCNC: 33 U/L (ref 0–45)
BASOPHILS # BLD AUTO: 0 10E3/UL (ref 0–0.2)
BASOPHILS NFR BLD AUTO: 1 %
BILIRUB DIRECT SERPL-MCNC: <0.2 MG/DL (ref 0–0.3)
BILIRUB SERPL-MCNC: 0.2 MG/DL
BUN SERPL-MCNC: 10.3 MG/DL (ref 6–20)
CALCIUM SERPL-MCNC: 8.9 MG/DL (ref 8.6–10)
CHLORIDE SERPL-SCNC: 107 MMOL/L (ref 98–107)
CREAT SERPL-MCNC: 0.86 MG/DL (ref 0.51–0.95)
DEPRECATED HCO3 PLAS-SCNC: 22 MMOL/L (ref 22–29)
EGFRCR SERPLBLD CKD-EPI 2021: 79 ML/MIN/1.73M2
EOSINOPHIL # BLD AUTO: 0.1 10E3/UL (ref 0–0.7)
EOSINOPHIL NFR BLD AUTO: 3 %
ERYTHROCYTE [DISTWIDTH] IN BLOOD BY AUTOMATED COUNT: 13.1 % (ref 10–15)
FLUAV RNA SPEC QL NAA+PROBE: NEGATIVE
FLUBV RNA RESP QL NAA+PROBE: NEGATIVE
GLUCOSE SERPL-MCNC: 101 MG/DL (ref 70–99)
HCT VFR BLD AUTO: 38.6 % (ref 35–47)
HGB BLD-MCNC: 12.1 G/DL (ref 11.7–15.7)
HOLD SPECIMEN: NORMAL
HOLD SPECIMEN: NORMAL
IMM GRANULOCYTES # BLD: 0.1 10E3/UL
IMM GRANULOCYTES NFR BLD: 1 %
LIPASE SERPL-CCNC: 40 U/L (ref 13–60)
LYMPHOCYTES # BLD AUTO: 1 10E3/UL (ref 0.8–5.3)
LYMPHOCYTES NFR BLD AUTO: 23 %
MCH RBC QN AUTO: 25.3 PG (ref 26.5–33)
MCHC RBC AUTO-ENTMCNC: 31.3 G/DL (ref 31.5–36.5)
MCV RBC AUTO: 81 FL (ref 78–100)
MONOCYTES # BLD AUTO: 0.6 10E3/UL (ref 0–1.3)
MONOCYTES NFR BLD AUTO: 15 %
NEUTROPHILS # BLD AUTO: 2.4 10E3/UL (ref 1.6–8.3)
NEUTROPHILS NFR BLD AUTO: 57 %
NRBC # BLD AUTO: 0 10E3/UL
NRBC BLD AUTO-RTO: 0 /100
PLATELET # BLD AUTO: 246 10E3/UL (ref 150–450)
POTASSIUM SERPL-SCNC: 3.6 MMOL/L (ref 3.4–5.3)
PROT SERPL-MCNC: 7.7 G/DL (ref 6.4–8.3)
RBC # BLD AUTO: 4.79 10E6/UL (ref 3.8–5.2)
RSV RNA SPEC NAA+PROBE: NEGATIVE
SARS-COV-2 RNA RESP QL NAA+PROBE: NEGATIVE
SODIUM SERPL-SCNC: 141 MMOL/L (ref 135–145)
WBC # BLD AUTO: 4.3 10E3/UL (ref 4–11)

## 2024-03-14 PROCEDURE — 87637 SARSCOV2&INF A&B&RSV AMP PRB: CPT | Performed by: STUDENT IN AN ORGANIZED HEALTH CARE EDUCATION/TRAINING PROGRAM

## 2024-03-14 PROCEDURE — 250N000011 HC RX IP 250 OP 636: Performed by: STUDENT IN AN ORGANIZED HEALTH CARE EDUCATION/TRAINING PROGRAM

## 2024-03-14 PROCEDURE — 258N000003 HC RX IP 258 OP 636: Performed by: STUDENT IN AN ORGANIZED HEALTH CARE EDUCATION/TRAINING PROGRAM

## 2024-03-14 PROCEDURE — 80048 BASIC METABOLIC PNL TOTAL CA: CPT | Performed by: STUDENT IN AN ORGANIZED HEALTH CARE EDUCATION/TRAINING PROGRAM

## 2024-03-14 PROCEDURE — 74177 CT ABD & PELVIS W/CONTRAST: CPT

## 2024-03-14 PROCEDURE — 82248 BILIRUBIN DIRECT: CPT | Performed by: STUDENT IN AN ORGANIZED HEALTH CARE EDUCATION/TRAINING PROGRAM

## 2024-03-14 PROCEDURE — 96374 THER/PROPH/DIAG INJ IV PUSH: CPT | Mod: 59

## 2024-03-14 PROCEDURE — 99285 EMERGENCY DEPT VISIT HI MDM: CPT | Mod: 25

## 2024-03-14 PROCEDURE — 250N000013 HC RX MED GY IP 250 OP 250 PS 637: Performed by: STUDENT IN AN ORGANIZED HEALTH CARE EDUCATION/TRAINING PROGRAM

## 2024-03-14 PROCEDURE — 96361 HYDRATE IV INFUSION ADD-ON: CPT

## 2024-03-14 PROCEDURE — 36415 COLL VENOUS BLD VENIPUNCTURE: CPT | Performed by: STUDENT IN AN ORGANIZED HEALTH CARE EDUCATION/TRAINING PROGRAM

## 2024-03-14 PROCEDURE — 85025 COMPLETE CBC W/AUTO DIFF WBC: CPT | Performed by: STUDENT IN AN ORGANIZED HEALTH CARE EDUCATION/TRAINING PROGRAM

## 2024-03-14 PROCEDURE — 83690 ASSAY OF LIPASE: CPT | Performed by: STUDENT IN AN ORGANIZED HEALTH CARE EDUCATION/TRAINING PROGRAM

## 2024-03-14 RX ORDER — DICYCLOMINE HCL 20 MG
20 TABLET ORAL ONCE
Status: COMPLETED | OUTPATIENT
Start: 2024-03-14 | End: 2024-03-14

## 2024-03-14 RX ORDER — ONDANSETRON 2 MG/ML
4 INJECTION INTRAMUSCULAR; INTRAVENOUS ONCE
Status: COMPLETED | OUTPATIENT
Start: 2024-03-14 | End: 2024-03-14

## 2024-03-14 RX ORDER — IOPAMIDOL 755 MG/ML
59 INJECTION, SOLUTION INTRAVASCULAR ONCE
Status: COMPLETED | OUTPATIENT
Start: 2024-03-15 | End: 2024-03-14

## 2024-03-14 RX ADMIN — ONDANSETRON 4 MG: 2 INJECTION INTRAMUSCULAR; INTRAVENOUS at 22:50

## 2024-03-14 RX ADMIN — IOPAMIDOL 59 ML: 755 INJECTION, SOLUTION INTRAVENOUS at 23:31

## 2024-03-14 RX ADMIN — DICYCLOMINE HYDROCHLORIDE 20 MG: 20 TABLET ORAL at 22:52

## 2024-03-14 RX ADMIN — SODIUM CHLORIDE, POTASSIUM CHLORIDE, SODIUM LACTATE AND CALCIUM CHLORIDE 1000 ML: 600; 310; 30; 20 INJECTION, SOLUTION INTRAVENOUS at 22:49

## 2024-03-14 ASSESSMENT — COLUMBIA-SUICIDE SEVERITY RATING SCALE - C-SSRS
2. HAVE YOU ACTUALLY HAD ANY THOUGHTS OF KILLING YOURSELF IN THE PAST MONTH?: NO
6. HAVE YOU EVER DONE ANYTHING, STARTED TO DO ANYTHING, OR PREPARED TO DO ANYTHING TO END YOUR LIFE?: NO
1. IN THE PAST MONTH, HAVE YOU WISHED YOU WERE DEAD OR WISHED YOU COULD GO TO SLEEP AND NOT WAKE UP?: YES

## 2024-03-15 ENCOUNTER — APPOINTMENT (OUTPATIENT)
Dept: ULTRASOUND IMAGING | Facility: HOSPITAL | Age: 54
End: 2024-03-15
Attending: STUDENT IN AN ORGANIZED HEALTH CARE EDUCATION/TRAINING PROGRAM
Payer: MEDICAID

## 2024-03-15 VITALS
HEART RATE: 69 BPM | SYSTOLIC BLOOD PRESSURE: 99 MMHG | WEIGHT: 122.5 LBS | TEMPERATURE: 98 F | OXYGEN SATURATION: 96 % | RESPIRATION RATE: 20 BRPM | BODY MASS INDEX: 27.71 KG/M2 | DIASTOLIC BLOOD PRESSURE: 55 MMHG

## 2024-03-15 LAB
ALBUMIN UR-MCNC: NEGATIVE MG/DL
APPEARANCE UR: CLEAR
BACTERIA #/AREA URNS HPF: ABNORMAL /HPF
BILIRUB UR QL STRIP: NEGATIVE
COLOR UR AUTO: COLORLESS
GLUCOSE UR STRIP-MCNC: NEGATIVE MG/DL
HGB UR QL STRIP: ABNORMAL
KETONES UR STRIP-MCNC: NEGATIVE MG/DL
LEUKOCYTE ESTERASE UR QL STRIP: NEGATIVE
NITRATE UR QL: NEGATIVE
PH UR STRIP: 6 [PH] (ref 5–7)
RBC URINE: 1 /HPF
SP GR UR STRIP: 1.03 (ref 1–1.03)
SQUAMOUS EPITHELIAL: <1 /HPF
UROBILINOGEN UR STRIP-MCNC: <2 MG/DL
WBC URINE: <1 /HPF

## 2024-03-15 PROCEDURE — 76705 ECHO EXAM OF ABDOMEN: CPT

## 2024-03-15 PROCEDURE — 81001 URINALYSIS AUTO W/SCOPE: CPT | Performed by: STUDENT IN AN ORGANIZED HEALTH CARE EDUCATION/TRAINING PROGRAM

## 2024-03-15 RX ORDER — ACETAMINOPHEN 500 MG
1000 TABLET ORAL EVERY 6 HOURS
Qty: 56 TABLET | Refills: 0 | Status: SHIPPED | OUTPATIENT
Start: 2024-03-15 | End: 2024-03-22

## 2024-03-15 RX ORDER — IBUPROFEN 200 MG
400 TABLET ORAL EVERY 6 HOURS
Qty: 56 TABLET | Refills: 0 | Status: SHIPPED | OUTPATIENT
Start: 2024-03-15 | End: 2024-03-22

## 2024-03-15 RX ORDER — OXYCODONE HYDROCHLORIDE 5 MG/1
5 TABLET ORAL EVERY 4 HOURS PRN
Qty: 12 TABLET | Refills: 0 | Status: SHIPPED | OUTPATIENT
Start: 2024-03-15 | End: 2024-03-19

## 2024-03-15 ASSESSMENT — ACTIVITIES OF DAILY LIVING (ADL): ADLS_ACUITY_SCORE: 35

## 2024-03-15 NOTE — ED PROVIDER NOTES
EMERGENCY DEPARTMENT ENCOUNTER      NAME: Geronimo Dumont  AGE: 54 year old female  YOB: 1970  MRN: [id number]  EVALUATION DATE & TIME: No admission date for patient encounter.    PCP: Ross Ibarra    ED PROVIDER: Avila Ernst MD      Chief Complaint   Patient presents with    Diarrhea    Abdominal Pain         FINAL IMPRESSION:  1. Renal colic          ED COURSE & MEDICAL DECISION MAKING:    Pertinent Labs & Imaging studies reviewed. (See chart for details)  54 year old female presents to the Emergency Department for evaluation of abd pain, diarrhea      9:53 PM I met with the patient, obtained history, performed an initial exam, and discussed options and plan for diagnostics and treatment here in the ED.    ED Course as of 03/15/24 0122   Thu Mar 14, 2024   2147 Patient is a 54-year-old female with no prior abdominal surgeries who presents to the emergency department with approximately 1 week of right upper quadrant abdominal pain, diarrhea, body aches, reported fever, lightheadedness.  She has associated nausea as well.  She has tenderness in the right upper quadrant and epigastrium.  Negative Kamara's.  Differential diagnosis includes cholecystitis, pancreatitis, colitis, viral infection, UTI.   2226 No transaminitis or pancreatitis.  No leukocytosis or anemia.  No electrolyte abnormalities or kidney injury.  Viral swabs negative.   2227 Pending urinalysis and CT.   Fri Mar 15, 2024   0004 CT scan showed hepatic steatosis.  No acute findings to explain the patient's symptoms.  Will proceed with right upper quadrant ultrasound at this time.   0004 Of note, was called to the room as CT tech had stated that she felt short of breath after her CT scan.  She is currently asymptomatic, normal vital signs, no abnormal breathing sounds, no respiratory distress.  Low suspicion for reaction to contrast.  Will continue to monitor.   0120 No biliary pathology and right upper quadrant ultrasound.  The patient's  pain may be from renal colic.  Will discharge with urology follow-up and pain medications.       Medical Decision Making    History:  Supplemental history from: Documented in chart and N/A  External Record(s) reviewed: Documented in chart    Work Up:  Chart documentation includes differential considered and any EKGs or imaging independently interpreted by provider, where specified.  In additional to work up documented, I considered the following work up: Documented in chart, if applicable.    External consultation:  Discussion of management with another provider: Documented in chart, if applicable    Complicating factors:  Care impacted by chronic illness: Other: chronic gastritis  Care affected by social determinants of health: N/A    Disposition considerations: Discharge. I prescribed additional prescription strength medication(s) as charted. See documentation for any additional details.        At the conclusion of the encounter I discussed the results of all of the tests and the disposition. The questions were answered. The patient or family acknowledged understanding and was agreeable with the care plan.     0 minutes of critical care time     MEDICATIONS GIVEN IN THE EMERGENCY:  Medications   lactated ringers BOLUS 1,000 mL (0 mLs Intravenous Stopped 3/14/24 4240)   dicyclomine (BENTYL) tablet 20 mg (20 mg Oral $Given 3/14/24 2252)   ondansetron (ZOFRAN) injection 4 mg (4 mg Intravenous $Given 3/14/24 2250)   iopamidol (ISOVUE-370) solution 59 mL (59 mLs Intravenous $Given 3/14/24 2331)       NEW PRESCRIPTIONS STARTED AT TODAY'S ER VISIT  New Prescriptions    ACETAMINOPHEN (TYLENOL) 500 MG TABLET    Take 2 tablets (1,000 mg) by mouth every 6 hours for 7 days    IBUPROFEN (ADVIL/MOTRIN) 200 MG TABLET    Take 2 tablets (400 mg) by mouth every 6 hours for 7 days    OXYCODONE (ROXICODONE) 5 MG TABLET    Take 1 tablet (5 mg) by mouth every 4 hours as needed for severe pain If pain is not improved with acetaminophen  "and ibuprofen.          =================================================================    HPI    Patient information was obtained from: Patient    Use of : Yes:  Phone  - Language Michelle Dumont is a 54 year old female with a pertinent history of chronic gastritis who presents to this ED walking for evaluation of abdominal pain.    Patient states she has been having diarrhea and dizziness all week. States the diarrhea brings on the dizziness and the dizziness will make her feel nauseous. Added she has right upper quadrant abdominal pain and \"burning\" pain with cramping just before she goes to the bathroom, but it is not present otherwise. Denies bloody or black stools, vomiting, or hematuria. Denies taking any medications for pain.     Per chart review, patient was seen at [facility] for chronic gastritis. Given exercises to help with neck and wrist pain. R c-spine showed Straightening of the normal cervical lordosis. Mild congenital baseline narrowing of the spinal canal. Vertebral body heights are maintained. No concerning marrow replacing lesions. No abnormal cord signal. Visualized posterior fossa contents are   unremarkable. Instrcuted to continue with medicaitons as prescribed. Patient agreeable to plan and left in stable condition.       PAST MEDICAL HISTORY:  No past medical history on file.    PAST SURGICAL HISTORY:  No past surgical history on file.        CURRENT MEDICATIONS:    acetaminophen (TYLENOL) 500 MG tablet  ibuprofen (ADVIL/MOTRIN) 200 MG tablet  oxyCODONE (ROXICODONE) 5 MG tablet  acetaminophen (TYLENOL) 500 MG tablet  baclofen (LIORESAL) 10 MG tablet  famotidine (PEPCID) 40 MG tablet  folic acid (FOLVITE) 1 MG tablet  vitamin D3 (CHOLECALCIFEROL) 50 mcg (2000 units) tablet        ALLERGIES:  Allergies   Allergen Reactions    Methylprednisolone Itching     Depo-medrol trigger finger injection     Iodinated Contrast Media      Hard to say if due to " contrast dye or pts primary complaint, pt did experience SOB during CT scan which was relieved after completed, was restless and chills after no fever       FAMILY HISTORY:  No family history on file.    SOCIAL HISTORY:   Social History     Socioeconomic History    Marital status:    Tobacco Use    Smoking status: Never     Passive exposure: Never    Smokeless tobacco: Never    Tobacco comments:     Chew betel nuts    Vaping Use    Vaping Use: Never used     Social Determinants of Health     Financial Resource Strain: Low Risk  (1/3/2024)    Financial Resource Strain     Within the past 12 months, have you or your family members you live with been unable to get utilities (heat, electricity) when it was really needed?: No   Food Insecurity: Low Risk  (1/3/2024)    Food Insecurity     Within the past 12 months, did you worry that your food would run out before you got money to buy more?: No     Within the past 12 months, did the food you bought just not last and you didn t have money to get more?: No   Transportation Needs: Low Risk  (1/3/2024)    Transportation Needs     Within the past 12 months, has lack of transportation kept you from medical appointments, getting your medicines, non-medical meetings or appointments, work, or from getting things that you need?: No   Interpersonal Safety: Low Risk  (1/3/2024)    Interpersonal Safety     Do you feel physically and emotionally safe where you currently live?: Yes     Within the past 12 months, have you been hit, slapped, kicked or otherwise physically hurt by someone?: No     Within the past 12 months, have you been humiliated or emotionally abused in other ways by your partner or ex-partner?: No   Housing Stability: Low Risk  (1/3/2024)    Housing Stability     Do you have housing? : Yes     Are you worried about losing your housing?: No       VITALS:  /65   Pulse 72   Temp 98  F (36.7  C) (Oral)   Resp 18   Wt 55.6 kg (122 lb 8 oz)   SpO2 97%    BMI 27.71 kg/m      PHYSICAL EXAM    Physical Exam  Vitals and nursing note reviewed.   Constitutional:       General: She is not in acute distress.     Appearance: Normal appearance. She is normal weight. She is not ill-appearing.   HENT:      Head: Normocephalic and atraumatic.      Nose: Nose normal.      Mouth/Throat:      Mouth: Mucous membranes are moist.      Pharynx: Oropharynx is clear.   Eyes:      Extraocular Movements: Extraocular movements intact.      Conjunctiva/sclera: Conjunctivae normal.      Pupils: Pupils are equal, round, and reactive to light.   Cardiovascular:      Rate and Rhythm: Normal rate and regular rhythm.      Pulses: Normal pulses.      Heart sounds: Normal heart sounds. No murmur heard.  Pulmonary:      Effort: Pulmonary effort is normal. No respiratory distress.      Breath sounds: Normal breath sounds.   Abdominal:      General: Abdomen is flat. There is no distension.      Palpations: Abdomen is soft.      Tenderness: There is abdominal tenderness (RUQ, negative Kamara's, epigastric). There is no right CVA tenderness, left CVA tenderness, guarding or rebound.   Musculoskeletal:         General: Normal range of motion.      Cervical back: Normal range of motion.      Right lower leg: No edema.      Left lower leg: No edema.   Skin:     General: Skin is warm and dry.      Capillary Refill: Capillary refill takes less than 2 seconds.      Coloration: Skin is not jaundiced.   Neurological:      General: No focal deficit present.      Mental Status: She is alert and oriented to person, place, and time. Mental status is at baseline.   Psychiatric:         Mood and Affect: Mood normal.         Behavior: Behavior normal.         Thought Content: Thought content normal.         Judgment: Judgment normal.            LAB:  All pertinent labs reviewed and interpreted.  Results for orders placed or performed during the hospital encounter of 03/14/24   CT Abdomen Pelvis w Contrast    Impression     IMPRESSION:   1.  Diffuse hepatic steatosis. This may be a source of abdominal pain.  2.  Gallbladder is mildly distended, though is suboptimally evaluated secondary to motion artifact in this region. If cholecystitis is clinically suspected, consider right upper quadrant ultrasound for further evaluation.  3.  Small nonobstructing calyceal calculus of the right upper pole. No ureterolithiasis or hydronephrosis. Unremarkable urinary bladder.  4.  Normal appendix.  5.  Small intraluminal fluid within the colon, which may be seen with mild diarrheal illness.   US Abdomen Limited    Impression    IMPRESSION:  1.  Hepatic steatosis. No suspicious hepatic mass.  2.  Nonobstructing 5 mm right upper renal calculus.       Symptomatic Influenza A/B, RSV, & SARS-CoV2 PCR (COVID-19) Nasopharyngeal    Specimen: Nasopharyngeal; Swab   Result Value Ref Range    Influenza A PCR Negative Negative    Influenza B PCR Negative Negative    RSV PCR Negative Negative    SARS CoV2 PCR Negative Negative   Basic metabolic panel   Result Value Ref Range    Sodium 141 135 - 145 mmol/L    Potassium 3.6 3.4 - 5.3 mmol/L    Chloride 107 98 - 107 mmol/L    Carbon Dioxide (CO2) 22 22 - 29 mmol/L    Anion Gap 12 7 - 15 mmol/L    Urea Nitrogen 10.3 6.0 - 20.0 mg/dL    Creatinine 0.86 0.51 - 0.95 mg/dL    GFR Estimate 79 >60 mL/min/1.73m2    Calcium 8.9 8.6 - 10.0 mg/dL    Glucose 101 (H) 70 - 99 mg/dL   CBC with platelets and differential   Result Value Ref Range    WBC Count 4.3 4.0 - 11.0 10e3/uL    RBC Count 4.79 3.80 - 5.20 10e6/uL    Hemoglobin 12.1 11.7 - 15.7 g/dL    Hematocrit 38.6 35.0 - 47.0 %    MCV 81 78 - 100 fL    MCH 25.3 (L) 26.5 - 33.0 pg    MCHC 31.3 (L) 31.5 - 36.5 g/dL    RDW 13.1 10.0 - 15.0 %    Platelet Count 246 150 - 450 10e3/uL    % Neutrophils 57 %    % Lymphocytes 23 %    % Monocytes 15 %    % Eosinophils 3 %    % Basophils 1 %    % Immature Granulocytes 1 %    NRBCs per 100 WBC 0 <1 /100    Absolute Neutrophils 2.4  1.6 - 8.3 10e3/uL    Absolute Lymphocytes 1.0 0.8 - 5.3 10e3/uL    Absolute Monocytes 0.6 0.0 - 1.3 10e3/uL    Absolute Eosinophils 0.1 0.0 - 0.7 10e3/uL    Absolute Basophils 0.0 0.0 - 0.2 10e3/uL    Absolute Immature Granulocytes 0.1 <=0.4 10e3/uL    Absolute NRBCs 0.0 10e3/uL   Extra Blue Top Tube   Result Value Ref Range    Hold Specimen JIC    Extra Red Top Tube   Result Value Ref Range    Hold Specimen JIC    UA with Microscopic reflex to Culture    Specimen: Urine, Clean Catch   Result Value Ref Range    Color Urine Colorless Colorless, Straw, Light Yellow, Yellow    Appearance Urine Clear Clear    Glucose Urine Negative Negative mg/dL    Bilirubin Urine Negative Negative    Ketones Urine Negative Negative mg/dL    Specific Gravity Urine 1.035 (H) 1.001 - 1.030    Blood Urine 0.03 mg/dL (A) Negative    pH Urine 6.0 5.0 - 7.0    Protein Albumin Urine Negative Negative mg/dL    Urobilinogen Urine <2.0 <2.0 mg/dL    Nitrite Urine Negative Negative    Leukocyte Esterase Urine Negative Negative    Bacteria Urine None Seen None Seen /HPF    RBC Urine 1 <=2 /HPF    WBC Urine <1 <=5 /HPF    Squamous Epithelials Urine <1 <=1 /HPF   Result Value Ref Range    Lipase 40 13 - 60 U/L   Hepatic panel   Result Value Ref Range    Protein Total 7.7 6.4 - 8.3 g/dL    Albumin 4.2 3.5 - 5.2 g/dL    Bilirubin Total 0.2 <=1.2 mg/dL    Alkaline Phosphatase 82 40 - 150 U/L    AST 33 0 - 45 U/L    ALT 32 0 - 50 U/L    Bilirubin Direct <0.20 0.00 - 0.30 mg/dL       RADIOLOGY:  Reviewed all pertinent imaging. Please see official radiology report.  US Abdomen Limited   Final Result   IMPRESSION:   1.  Hepatic steatosis. No suspicious hepatic mass.   2.  Nonobstructing 5 mm right upper renal calculus.            CT Abdomen Pelvis w Contrast   Final Result   IMPRESSION:    1.  Diffuse hepatic steatosis. This may be a source of abdominal pain.   2.  Gallbladder is mildly distended, though is suboptimally evaluated secondary to motion  artifact in this region. If cholecystitis is clinically suspected, consider right upper quadrant ultrasound for further evaluation.   3.  Small nonobstructing calyceal calculus of the right upper pole. No ureterolithiasis or hydronephrosis. Unremarkable urinary bladder.   4.  Normal appendix.   5.  Small intraluminal fluid within the colon, which may be seen with mild diarrheal illness.          PROCEDURES:   None      [facility] Documentation:   CMS Diagnoses:               I, Sarai Olivas, am serving as a scribe to document services personally performed by Avila Ernst MD, based on my observation and the provider's statements to me. I, Avila Ernst MD, attest that Sarai Olivas is acting in a scribe capacity, has observed my performance of the services and has documented them in accordance with my direction.    Avila Ernst MD  [facility] EMERGENCY DEPARTMENT  [address] [place] [id number]  [phone]       Avila Ernst MD  03/15/24 0122

## 2024-03-15 NOTE — ED TRIAGE NOTES
Abdominal pain, diarrhea, generalized body aches, fever, headache, dizziness. Took tylenol at 6:40 pm. No sob/cp.

## 2024-03-15 NOTE — ED NOTES
Pt reported to have episode of shortness of breath during CT scan, writer rounded on pt and this SOB has improved but does note some heaviness in chest, propped up in bed, has the chills and is a bit restless, LR still infusing. MD briefed.

## 2024-03-19 ENCOUNTER — PATIENT OUTREACH (OUTPATIENT)
Dept: CARE COORDINATION | Facility: CLINIC | Age: 54
End: 2024-03-19
Payer: MEDICAID

## 2024-03-25 ENCOUNTER — PATIENT OUTREACH (OUTPATIENT)
Dept: CARE COORDINATION | Facility: CLINIC | Age: 54
End: 2024-03-25
Payer: MEDICAID

## 2024-03-25 NOTE — PROGRESS NOTES
Clinic Care Coordination Contact  [facility]    Clinical Data: Care Coordinator Outreach    Outreach Documentation Number of Outreach Attempt   3/25/2024  11:34 AM 1     CHW called, unable to reach and left message on patient's voicemail with call back information and requested return call.     CHW also updated [facility] Billing Department regarding outstanding bill balance and FRW is assisting patient with renewal.     Plan: Care Coordinator will try to reach patient again in two weeks.    Major Programs  This subscriber has eligibility for MA: Medical Assistance.  Aye Type AX: MA Early Expansion  Eligibility Begin Date: 03/01/2024  Eligibility End Date: 03/31/2024  This subscriber is eligible for the following service types: Medical Care ,  Chiropractic ,  Dental Care ,  Hospital ,  Hospital - Inpatient ,  Hospital - Outpatient ,  Emergency Services ,  Pharmacy ,  Professional (Physician) Visit - Office ,  Vision (Optometry) ,  Mental Health ,  Urgent Care    Prepaid Health Plan  None

## 2024-04-01 ENCOUNTER — PATIENT OUTREACH (OUTPATIENT)
Dept: CARE COORDINATION | Facility: CLINIC | Age: 54
End: 2024-04-01
Payer: COMMERCIAL

## 2024-04-01 NOTE — LETTER
[facility]  Patient Centered Plan of Care  About Me:        Patient Name:  Geronimo Dumont    YOB: 1970  Age:         54 year old   Ally MRN:    [id number] Telephone Information:  Home Phone [phone]   Mobile [phone]   Mobile Not on file.       Address:  [address]  [place] [address] Email address:  [email or web address]      Emergency Contact(s)    Name Relationship Lgl Grd Work Phone Home Phone Mobile Phone   1. KENYON GUNN Other   [phone]            Primary language:  Michelle     needed? Yes   [employer]:  [phone] op. 1  Other communication barriers:Language barrier    Preferred Method of Communication:     Current living arrangement: I live in a private home with family    Mobility Status/ Medical Equipment: Independent        Health Maintenance  Health Maintenance Reviewed: Due/Overdue   Health Maintenance Due   Topic Date Due    YEARLY PREVENTIVE VISIT  Never done    ADVANCE CARE PLANNING  Never done    HEPATITIS B IMMUNIZATION (1 of 3 - 19+ 3-dose series) Never done    PAP  Never done    ZOSTER IMMUNIZATION (1 of 2) Never done    MAMMO SCREENING  07/02/2020    INFLUENZA VACCINE (1) 09/01/2023    COVID-19 Vaccine (1 - 2023-24 season) Never done           My Access Plan  Medical Emergency 911   Primary Clinic Line [place] [phone]   24 Hour Appointment Line [phone] or  [phone]) (toll-free)   24 Hour Nurse Line [phone] (toll-free)   Preferred Urgent Care [facility] [phone]     Preferred Hospital [facility]  [phone]     [id number] - [place], [place] - [address] AT [address]     Behavioral Health Crisis Line The National Suicide Prevention Lifeline at 1-617.800.8633 or Text/Call 708           My Care Team Members  Patient Care Team         Relationship Specialty  Notifications Start End    Ross Ibarra MD PCP - General Family Medicine  1/3/24     Phone: [phone] Fax: [phone]         [address] [facility] [address]    Jose Cruz Orosco CHW Community Health Worker Primary Care - CC Admissions 1/3/24     Phone: [phone] Fax: [phone]         [address] [place] [id number]    Suzette Pruitt MA Financial Resource Worker   1/4/24     Phone: [phone]         Ross Ibarra MD Assigned PCP   1/6/24     Phone: [phone] Fax: [phone]         [address] [facility] [address]    Caitlyn Torres MSW Lead Care Coordinator  Admissions 1/8/24                 My Care Plans  Self Management and Treatment Plan    Care Plan  Care Plan: Financial Wellbeing       Problem: Patient expresses financial resource strain                   Care Plan: Health Maintenance       Problem: Health Maintenance Due or Overdue       Goal: Become up-to-date with health maintenance visit(s)       Start Date: 1/8/2024    This Visit's Progress: 60% Recent Progress: 50%    Note:     Barriers: Insurance   Strengths: Motivated   Patient expressed understanding of goal: yes    Action steps to achieve this goal:  1. I will attend scheduled  annual wellness visit in next month  2. I will contact my Care Management or clinic team if I have barriers to attending my annual wellness visit.                                Action Plans on File:                       Advance Care Plans/Directives:   Advanced Care Plan/Directives on file:   No    Discussed with patient/caregiver(s): No data recorded           My Medical and Care Information  Problem List   Patient Active Problem List   Diagnosis    Left hand pain    Vitamin D deficiency    Low folate    Chronic gastritis without bleeding, unspecified gastritis type    Cervical stenosis of spinal canal    Generalized body aches    Carpal tunnel syndrome of left wrist      Current Medications and Allergies:  See printed Medication  Report.    Care Coordination Start Date: 1/3/2024   Frequency of Care Coordination: monthly, more frequently as needed     Form Last Updated: 04/01/2024

## 2024-04-01 NOTE — PROGRESS NOTES
Care Coordination Clinician Chart Review    Situation: Patient chart reviewed by Care Coordinator.       Background: Care Coordination Program started: 1/3/2024. Initial assessment completed and patient-centered care plan(s) were developed with participation from patient. Lead CC handed patient off to CHW for continued outreaches.       Assessment: Per chart review, patient outreach completed by CC CHW on 03/25/2024.  Patient is actively working to accomplish goal(s). Patient's goal(s) appropriate and relevant at this time. Patient is due for updated Plan of Care.  Assessments will be completed annually or as needed/with change of patient status.      Care Plan: Financial Wellbeing       Problem: Patient expresses financial resource strain                   Care Plan: Health Maintenance       Problem: Health Maintenance Due or Overdue       Goal: Become up-to-date with health maintenance visit(s)       Start Date: 1/8/2024    This Visit's Progress: 60% Recent Progress: 50%    Note:     Barriers: Insurance   Strengths: Motivated   Patient expressed understanding of goal: yes    Action steps to achieve this goal:  1. I will attend scheduled  annual wellness visit in next month  2. I will contact my Care Management or clinic team if I have barriers to attending my annual wellness visit.                                   Plan/Recommendations: The patient will continue working with Care Coordination to achieve goal(s) as above. CHW will continue outreaches at minimum every 30 days and will involve Lead CC as needed or if patient is ready to move to Maintenance. Lead CC will continue to monitor CHW outreaches and patient's progress to goal(s) every 6 weeks.     Plan of Care updated and sent to patient: Yes, via mail    Amber Torres [facility]  Social Work Care [facility]   Phone: [phone]

## 2024-04-09 ENCOUNTER — PATIENT OUTREACH (OUTPATIENT)
Dept: CARE COORDINATION | Facility: CLINIC | Age: 54
End: 2024-04-09
Payer: COMMERCIAL

## 2024-04-09 NOTE — PROGRESS NOTES
Clinic Care Coordination Contact  Community Health Worker Follow Up    Care Gaps:   Health Maintenance Due   Topic Date Due    YEARLY PREVENTIVE VISIT  Never done    ADVANCE CARE PLANNING  Never done    HEPATITIS B IMMUNIZATION (1 of 3 - 19+ 3-dose series) Never done    PAP  Never done    ZOSTER IMMUNIZATION (1 of 2) Never done    MAMMO SCREENING  07/02/2020    INFLUENZA VACCINE (1) 09/01/2023    COVID-19 Vaccine (1 - 2023-24 season) Never done     Declined due to patient's health insurance is with [facility] and can not afford co-pay.      Care Plan:   Care Plan: Financial Wellbeing           Care Plan: Health Maintenance       Problem: Health Maintenance Due or Overdue                   Care Plan: Priya Care for Outstanding balance       Problem: HP GENERAL PROBLEM       Goal: I would like help to resolve outstand bill balance in the next 3 months.       Start Date: 4/9/2024 Expected End Date: 7/9/2024    This Visit's Progress: 10%    Note:     Barriers: Language barrier and lack of community resources.  Strengths: Willing to accept helps and has family supports.  Patient expressed understanding of goal: Yes.     Action steps to achieve this goal:  1. I will answer my phone when FRW calls to assist with Priya Care Application.   2. I will provide all necessary information.   3. I will update CCC team at outreach.                            Intervention and Education during outreach:  -Patient's health insurance is active with [facility].  -Patient refused to schedule preventive care due to high co-pay with [facility].  -Patient has outstanding bill balance of $471 and patient stated she can not afford to pay.  -This outreach encounter routed to FRW and [facility] SW to assist with Priya Care Application.   -Patient was informed to call with questions or concerns.     CHW Next Outreach: In one month.     [place] Department [place]: [place] Eligibility Response  (399)  SUBSCRIBER INFORMATION   Date of Service Subscriber ID Subscriber Name Birthdate Age Gender   04/09/2024 [id number] WAY PAW 1970 54 FEMALE          Address   [address] , [place]  [address]          PROVIDER INFORMATION   Provider ID Submitter Transaction ID Provider Name Taxonomy Code Qualifier Taxonomy Code   [id number] xx [facility] Programs   This subscriber is eligible for FF: [place].  Elig Type M2: [place] group II  Eligibility Begin Date: 04/01/2024  Eligibility End Date: --/--/----  This subscriber is eligible for the following service types: Medical Care ,  Chiropractic ,  Dental Care ,  Hospital ,  Hospital - Inpatient ,  Hospital - Outpatient ,  Emergency Services ,  Pharmacy ,  Professional (Physician) Visit - Office ,  Vision (Optometry) ,  Mental Health ,  Urgent Care   Prepaid Health Plan   This subscriber receives FF01 - [place] delivered through [facility]. The phone numbers are: [phone] (metro) or [phone] (toll free).   Other Eligibility Information   No Special Transportation.  No Hospice.  County of residence is unknown.  Refer to Health Care Programs and Services Overview of the [facility] Provider Manual for a list of covered services.   Waivers   None     Subscriber Responsibility Information   An office visit copay of 28 dollars may exist for this subscriber.  A copay of 100 dollars for Emergency Room may exist for this subscriber.  A copay of 45 dollars for Radiology (Diagnostic X-Ray, Screening X-Ray, MRI/CAT Scan) may exist for this subscriber.  A copay of 250 dollars for Hospital Inpatient may exist for this subscriber.  A copay of 10 dollars for Eyeglasses may exist for this subscriber.   Restricted Recipient Program   None       Medicare   None

## 2024-04-12 ENCOUNTER — PATIENT OUTREACH (OUTPATIENT)
Dept: CARE COORDINATION | Facility: CLINIC | Age: 54
End: 2024-04-12
Payer: COMMERCIAL

## 2024-05-13 ENCOUNTER — PATIENT OUTREACH (OUTPATIENT)
Dept: CARE COORDINATION | Facility: CLINIC | Age: 54
End: 2024-05-13
Payer: COMMERCIAL

## 2024-05-13 NOTE — PROGRESS NOTES
Care Coordination Clinician Chart Review    Situation: Patient chart reviewed by Care Coordinator.       Background: Care Coordination Program started: 1/3/2024. Initial assessment completed and patient-centered care plan(s) were developed with participation from patient. Lead CC handed patient off to CHW for continued outreaches.       Assessment: Per chart review, patient outreach completed by CC CHW on 04/09/2024.  Patient is actively working to accomplish goal(s). Patient's goal(s) appropriate and relevant at this time. Patient is not due for updated Plan of Care.  Assessments will be completed annually or as needed/with change of patient status.      Care Plan: Financial Wellbeing           Care Plan: Health Maintenance       Problem: Health Maintenance Due or Overdue                   Care Plan: Priya Care for Outstanding balance       Problem: HP GENERAL PROBLEM       Goal: I would like help to resolve outstand bill balance in the next 3 months.       Start Date: 4/9/2024 Expected End Date: 7/9/2024    This Visit's Progress: 10%    Note:     Barriers: Language barrier and lack of community resources.  Strengths: Willing to accept helps and has family supports.  Patient expressed understanding of goal: Yes.     Action steps to achieve this goal:  1. I will answer my phone when FRW calls to assist with Priay Care Application.   2. I will provide all necessary information.   3. I will update CCC team at outreach.                                   Plan/Recommendations: The patient will continue working with Care Coordination to achieve goal(s) as above. CHW will continue outreaches at minimum every 30 days and will involve Lead CC as needed or if patient is ready to move to Maintenance. Lead CC will continue to monitor CHW outreaches and patient's progress to goal(s) every 6 weeks.     Plan of Care updated and sent to patient: [facility]  [facility]   Phone: [phone]

## 2024-05-14 ENCOUNTER — TELEPHONE (OUTPATIENT)
Dept: FAMILY MEDICINE | Facility: CLINIC | Age: 54
End: 2024-05-14
Payer: COMMERCIAL

## 2024-05-14 ENCOUNTER — PATIENT OUTREACH (OUTPATIENT)
Dept: CARE COORDINATION | Facility: CLINIC | Age: 54
End: 2024-05-14
Payer: COMMERCIAL

## 2024-05-14 NOTE — TELEPHONE ENCOUNTER
General Call    Contacts         Type Contact Phone/Fax    05/14/2024 01:39 PM CDT Phone (Incoming) Geronimo Dumont (Self) [phone] (H)          Reason for Call:  pt insurance is ending 5/31/24 and needs help to renew    What are your questions or concerns:  needs help with insurance    Date of last appointment with provider: 2/19/24    Okay to leave a detailed message?: Yes at Home number on file [phone] ([place])

## 2024-05-14 NOTE — PROGRESS NOTES
Clinic Care Coordination Contact  Patient has completed all goals with Clinic Care Coordination.  Please review the chart and confirm if maintenance is approved.    -Per chart review, patient has $0 outstanding bill balance.  -Spouse works and gross income for family is $2,720.  -Patient candice like assistance to follow up on [facility] SNAP and health insurance and this outreach encounter is routed to Suzette TAYLOR to help follow up.   -Patient has no additional coordination assistance at this time.  -Patient was informed to call with questions or concerns.   -Chart review routed to [facility] JEANNE to further review and advise.     [place] Department [place]: [place] Eligibility Response (853)    SUBSCRIBER INFORMATION   Date of Service Subscriber ID Subscriber Name Birthdate Age Gender   05/14/2024 [id number] WAY PAW 1970 54 FEMALE          Address   [address] , [place]  [address]          PROVIDER INFORMATION   Provider ID Submitter Transaction ID Provider Name Taxonomy Code Qualifier Taxonomy Code   [id number] xx [facility] Programs   This subscriber is eligible for FF: [facility].  Elig Type M2: [place] group II  Eligibility Begin Date: 04/01/2024  Eligibility End Date: 05/31/2024  This subscriber is eligible for the following service types: Medical Care ,  Chiropractic ,  Dental Care ,  Hospital ,  Hospital - Inpatient ,  Hospital - Outpatient ,  Emergency Services ,  Pharmacy ,  Professional (Physician) Visit - Office ,  Vision (Optometry) ,  Mental Health ,  Urgent Care   Prepaid Health Plan   This subscriber receives FF01 - [place] delivered through [facility]. The phone numbers are: [phone] (metro) or [phone] (toll free).   Other Eligibility Information   No Special Transportation.  No Hospice.  County of residence is unknown.  Refer to Health Care Programs and Services Overview of the  [facility] Provider Manual for a list of covered services.   Waivers   None     Subscriber Responsibility Information   An office visit copay of 28 dollars may exist for this subscriber.  A copay of 100 dollars for Emergency Room may exist for this subscriber.  A copay of 45 dollars for Radiology (Diagnostic X-Ray, Screening X-Ray, MRI/CAT Scan) may exist for this subscriber.  A copay of 250 dollars for Hospital Inpatient may exist for this subscriber.  A copay of 10 dollars for Eyeglasses may exist for this subscriber.   Restricted Recipient Program   None       Medicare   None

## 2024-05-15 ENCOUNTER — PATIENT OUTREACH (OUTPATIENT)
Dept: CARE COORDINATION | Facility: CLINIC | Age: 54
End: 2024-05-15
Payer: COMMERCIAL

## 2024-05-15 NOTE — PROGRESS NOTES
Clinic Care Coordination Contact  Patient has completed all goals with Clinic Care Coordination.  Please review the chart and confirm if maintenance  is approved.    Amber Torres [facility]  Social Work Care [facility]   Phone: [phone]

## 2024-05-17 ENCOUNTER — TELEPHONE (OUTPATIENT)
Dept: MAMMOGRAPHY | Facility: CLINIC | Age: 54
End: 2024-05-17
Payer: COMMERCIAL

## 2024-05-17 ENCOUNTER — APPOINTMENT (OUTPATIENT)
Dept: INTERPRETER SERVICES | Facility: CLINIC | Age: 54
End: 2024-05-17
Payer: COMMERCIAL

## 2024-05-17 NOTE — TELEPHONE ENCOUNTER
Patient Quality Outreach    Patient is due for the following:   Breast Cancer Screening - Mammogram    Next Steps:   Patient declined follow up at this time.    Type of outreach:    Phone, spoke to patient/parent. Unable to schedule at this time, and needs to reschedule Dr;s visit for 5/21    Next Steps:  Reach out within 90 days via Phone.    Max number of attempts reached: No. Will try again in 90 days if patient still on fail list.    Questions for provider review:    None           GABRIELA Gonzalez  Chart routed to Care Team.

## 2024-05-21 ENCOUNTER — VIRTUAL VISIT (OUTPATIENT)
Dept: INTERPRETER SERVICES | Facility: CLINIC | Age: 54
End: 2024-05-21

## 2024-05-21 ENCOUNTER — OFFICE VISIT (OUTPATIENT)
Dept: FAMILY MEDICINE | Facility: CLINIC | Age: 54
End: 2024-05-21
Payer: COMMERCIAL

## 2024-05-21 VITALS
HEIGHT: 56 IN | DIASTOLIC BLOOD PRESSURE: 62 MMHG | RESPIRATION RATE: 16 BRPM | WEIGHT: 126.06 LBS | BODY MASS INDEX: 28.36 KG/M2 | OXYGEN SATURATION: 97 % | HEART RATE: 78 BPM | TEMPERATURE: 97.9 F | SYSTOLIC BLOOD PRESSURE: 100 MMHG

## 2024-05-21 DIAGNOSIS — Z01.84 IMMUNITY STATUS TESTING: ICD-10-CM

## 2024-05-21 DIAGNOSIS — Z12.4 CERVICAL CANCER SCREENING: ICD-10-CM

## 2024-05-21 DIAGNOSIS — R52 GENERALIZED BODY ACHES: Primary | ICD-10-CM

## 2024-05-21 DIAGNOSIS — E55.9 VITAMIN D DEFICIENCY: ICD-10-CM

## 2024-05-21 DIAGNOSIS — E53.8 LOW FOLATE: ICD-10-CM

## 2024-05-21 DIAGNOSIS — R73.9 HYPERGLYCEMIA: ICD-10-CM

## 2024-05-21 DIAGNOSIS — M48.02 CERVICAL STENOSIS OF SPINAL CANAL: ICD-10-CM

## 2024-05-21 LAB
FOLATE SERPL-MCNC: 3.1 NG/ML (ref 4.6–34.8)
HBA1C MFR BLD: 5.2 % (ref 0–5.6)
HBV SURFACE AB SERPL IA-ACNC: >1000 M[IU]/ML
HBV SURFACE AB SERPL IA-ACNC: REACTIVE M[IU]/ML
VIT D+METAB SERPL-MCNC: 18 NG/ML (ref 20–50)

## 2024-05-21 PROCEDURE — 99214 OFFICE O/P EST MOD 30 MIN: CPT | Performed by: FAMILY MEDICINE

## 2024-05-21 PROCEDURE — 36415 COLL VENOUS BLD VENIPUNCTURE: CPT | Performed by: FAMILY MEDICINE

## 2024-05-21 PROCEDURE — 83036 HEMOGLOBIN GLYCOSYLATED A1C: CPT | Performed by: FAMILY MEDICINE

## 2024-05-21 PROCEDURE — 87624 HPV HI-RISK TYP POOLED RSLT: CPT | Performed by: FAMILY MEDICINE

## 2024-05-21 PROCEDURE — G0145 SCR C/V CYTO,THINLAYER,RESCR: HCPCS | Performed by: FAMILY MEDICINE

## 2024-05-21 PROCEDURE — 86706 HEP B SURFACE ANTIBODY: CPT | Performed by: FAMILY MEDICINE

## 2024-05-21 PROCEDURE — T1013 SIGN LANG/ORAL INTERPRETER: HCPCS | Mod: U4 | Performed by: INTERPRETER

## 2024-05-21 PROCEDURE — 82746 ASSAY OF FOLIC ACID SERUM: CPT | Performed by: FAMILY MEDICINE

## 2024-05-21 PROCEDURE — G2211 COMPLEX E/M VISIT ADD ON: HCPCS | Performed by: FAMILY MEDICINE

## 2024-05-21 PROCEDURE — 82306 VITAMIN D 25 HYDROXY: CPT | Performed by: FAMILY MEDICINE

## 2024-05-21 NOTE — PATIENT INSTRUCTIONS
-Thank you for choosing the [facility].  -It was a pleasure to see you today.  -Please take a look at the information below for more specific details regarding the treatment plan and recommendations.  -In this after visit summary is a list of your medications and specific instructions.  Please review this carefully as there may be changes made to your medication list.  -If there are any particular questions or concerns, please feel free to reach out to Dr. Ibarra.  -If any labs have been completed, we will reach out to you about results.  If the results are normal or not concerning, a letter or Eventfindahart message will be sent to you.  If any follow-up is needed, either Dr. Ibarra or the nurse will give you a call.  If you have not heard regarding results after 2 weeks, please reach out to the clinic.    Patient Instructions:    -Complete labs as ordered.   -Further recommendations pending lab results.   -Take the baclofen and acetaminophen as needed for pains.     Please seek immediate medical attention (go to the emergency room or urgent care) for the following reasons: worsening symptoms, or any concerning changes.      --------------------------------------------------------------------------------------------------------------------    -We are always looking for ways to improve.  You may be selected to receive a survey regarding your visit today.  We encourage you to complete the survey and provide specific, constructive feedback to help us improve our processes.  Thank you for your time!  -Please review the contact information listed on the after visit summary and in the electronic chart.  Below is the phone number that we have on file.  If there are any changes that are needed to be made, please reach out to the clinic.  [phone] (home)

## 2024-05-21 NOTE — PROGRESS NOTES
OFFICE VISIT    Assessment/Plan:     Patient Instructions:    -Complete labs as ordered.   -Further recommendations pending lab results.   -Take the baclofen and acetaminophen as needed for pains.     Please seek immediate medical attention (go to the emergency room or urgent care) for the following reasons: worsening symptoms, or any concerning changes.      Way was seen today for follow up.  Diagnoses and all orders for this visit:    Generalized body aches  Cervical stenosis of spinal canal: continues to be improved. Recheck labs as below. Patient has not taken the vitamin D or folate for a couple of months now. Reviewed usual treatment course.     Vitamin D deficiency  -     Vitamin D Deficiency; Future    Low folate  -     Folate; Future    Hyperglycemia  -     Hemoglobin A1c; Future    Immunity status testing  -     Hepatitis B Surface Antibody; Future    Cervical cancer screening  -     Gynecologic Cytology (Pap) and HPV - Recommended Age 30-65 Years        Return in about 6 months (around 11/21/2024) for Medication follow up.    The diagnoses, treatment options, risk, benefits, and recommendations were reviewed with patient/guardian.  Questions were answered to patient's/guardian satisfaction.  Red flag signs were reviewed.  Patient/guardian is in agreement with above plan.      Subjective: 54 year old female with history of gastritis, folate and vitamin D deficiency, constipation, cervical stenosis of spinal canal (C3-C5) who presents to clinic for the following complaints:   Patient presents with:  Follow Up: Medication     Patient noted to have a history of cervical stenosis of spinal canal in addition to generalized bodyaches, neck pain, folate deficiency, and vitamin D deficiency.  She had been placed on folic acid and vitamin D replacement and had been given acetaminophen and baclofen to help with pain control.  When she was last seen by this provider on 2/19/2024, patient had reported significant  improvement since then.  Labs have not been completed to reevaluate the folic acid and vitamin D level since then.    Of note, patient was seen in the emergency room on 3/14/2024 and was diagnosed with renal colic.  Patient treated symptomatically.      Since then, patient has been feeling good overall. She doesn't feel any different. She fully recovered after the ER visit. Body aches, leg pains, and neck stiffness/pains are still there sometimes, but overall happening less often and are less intense compared to before. She doesn't really need medications to help control the pains she has been having. Denies any significant issues or side effects with the medications. She reports that she doesn't take anymore medications now. The last time she took medications was 1-2 months ago.     HM due was reviewed with patient/parent.  Recommendations, risk, benefits were reviewed.  Accepted recommendations were ordered.  Otherwise, patient/parent declined.    Health Maintenance Due   Topic Date Due    YEARLY PREVENTIVE VISIT  Never done    ADVANCE CARE PLANNING  Never done    HEPATITIS B IMMUNIZATION (1 of 3 - 19+ 3-dose series) Never done    PAP  Never done    ZOSTER IMMUNIZATION (1 of 2) Never done    MAMMO SCREENING  07/02/2020    COVID-19 Vaccine (1 - 2023-24 season) Never done     She had a pap smear done a long time ago (result: normal).      Immunization History   Administered Date(s) Administered    Influenza Vaccine >6 months,quad, PF 12/04/2017    Influenza Vaccine, 6+MO IM (QUADRIVALENT W/PRESERVATIVES) 10/05/2016    TDAP (Adacel,Boostrix) 01/11/2017       A professional [employer] telephone  was utilized for the office visit.     The 10 point review of system is negative except as stated in the HPI.    Allergies were reviewed and updated.    Objective:   /62 (BP Location: Left arm, Patient Position: Sitting, Cuff Size: Adult Regular)   Pulse 78   Temp 97.9  F (36.6  C) (Oral)   Resp 16   Ht  "1.416 m (4' 7.75\")   Wt 57.2 kg (126 lb 1 oz)   SpO2 97%   BMI 28.52 kg/m    General: Active, alert, nontoxic-appearing.  No acute distress.  HEENT: Normocephalic, atraumatic.  Pupils are equal and round.  Sclera is clear.  Normal external ears. Nares patent.  Moist mucous membranes.    Cardiac: RRR.  S1, S2 present.  No murmurs, rubs, or gallops.  Respiratory/chest: Clear to auscultation bilaterally.  No wheezes, rales, rhonchi.  Breathing is not labored.  No accessory muscle usage.  Abdomen: Soft, nondistended, nontender.  No masses or organomegaly noted.  No guarding or rebound tenderness appreciated.  Extremities: Voluntary movements intact.  Integumentary: No concerning rash or skin changes appreciated.        Ross Ibarra MD  [facility] [place] [address]  Phone: [phone]  Fax: [phone]    5/23/2024  7:28 AM          Current Outpatient Medications   Medication Sig Dispense Refill    acetaminophen (TYLENOL) 500 MG tablet Take 2 tablets (1,000 mg) by mouth every 8 hours as needed for pain or fever 100 tablet 11    baclofen (LIORESAL) 10 MG tablet Take 1 tablet (10 mg) by mouth 3 times daily as needed for muscle spasms 100 tablet 11    famotidine (PEPCID) 40 MG tablet Take 1 tablet (40 mg) by mouth nightly as needed for heartburn 90 tablet 3    folic acid (FOLVITE) 1 MG tablet Take 1 tablet (1 mg) by mouth daily 90 tablet 3    vitamin D3 (CHOLECALCIFEROL) 50 mcg (2000 units) tablet Take 1 tablet (50 mcg) by mouth daily 90 tablet 3     No current facility-administered medications for this visit.       Allergies   Allergen Reactions    Methylprednisolone Itching     Depo-medrol trigger finger injection     Iodinated Contrast Media      Hard to say if due to contrast dye or pts primary complaint, pt did experience SOB during CT scan which was relieved after completed, was restless and chills after no fever       Patient Active Problem List    Diagnosis Date Noted    Chronic " gastritis without bleeding, unspecified gastritis type 01/05/2024     Priority: Medium    Cervical stenosis of spinal canal 01/05/2024     Priority: Medium    Generalized body aches 01/05/2024     Priority: Medium    Carpal tunnel syndrome of left wrist 01/05/2024     Priority: Medium    Vitamin D deficiency 12/15/2017     Priority: Medium    Low folate 12/05/2017     Priority: Medium    Left hand pain 07/03/2017     Priority: Medium       No family history on file.    No past surgical history on file.     Social History     Socioeconomic History    Marital status:      Spouse name: Not on file    Number of children: Not on file    Years of education: Not on file    Highest education level: Not on file   Occupational History    Not on file   Tobacco Use    Smoking status: Never     Passive exposure: Never    Smokeless tobacco: Never    Tobacco comments:     Chew betel nuts    Vaping Use    Vaping status: Never Used   Substance and Sexual Activity    Alcohol use: Not on file    Drug use: Not on file    Sexual activity: Not on file   Other Topics Concern    Not on file   Social History Narrative    Not on file     Social Determinants of Health     Financial Resource Strain: Low Risk  (1/3/2024)    Financial Resource Strain     Within the past 12 months, have you or your family members you live with been unable to get utilities (heat, electricity) when it was really needed?: No   Food Insecurity: Low Risk  (1/3/2024)    Food Insecurity     Within the past 12 months, did you worry that your food would run out before you got money to buy more?: No     Within the past 12 months, did the food you bought just not last and you didn t have money to get more?: No   Transportation Needs: Low Risk  (1/3/2024)    Transportation Needs     Within the past 12 months, has lack of transportation kept you from medical appointments, getting your medicines, non-medical meetings or appointments, work, or from getting things that  you need?: No   Physical Activity: Not on file   Stress: Not on file   Social Connections: Not on file   Interpersonal Safety: Low Risk  (1/3/2024)    Interpersonal Safety     Do you feel physically and emotionally safe where you currently live?: Yes     Within the past 12 months, have you been hit, slapped, kicked or otherwise physically hurt by someone?: No     Within the past 12 months, have you been humiliated or emotionally abused in other ways by your partner or ex-partner?: No   Housing Stability: Low Risk  (1/3/2024)    Housing Stability     Do you have housing? : Yes     Are you worried about losing your housing?: No       Answers submitted by the patient for this visit:  General Questionnaire (Submitted on 5/21/2024)  Chief Complaint: Chronic problems general questions HPI Form  What is the reason for your visit today? : f/u medication

## 2024-05-22 LAB
HPV HR 12 DNA CVX QL NAA+PROBE: NEGATIVE
HPV16 DNA CVX QL NAA+PROBE: NEGATIVE
HPV18 DNA CVX QL NAA+PROBE: NEGATIVE
HUMAN PAPILLOMA VIRUS FINAL DIAGNOSIS: NORMAL

## 2024-05-24 LAB
BKR LAB AP GYN ADEQUACY: NORMAL
BKR LAB AP GYN INTERPRETATION: NORMAL
BKR LAB AP PREVIOUS ABNORMAL: NORMAL
PATH REPORT.COMMENTS IMP SPEC: NORMAL
PATH REPORT.COMMENTS IMP SPEC: NORMAL
PATH REPORT.RELEVANT HX SPEC: NORMAL

## 2024-05-28 ENCOUNTER — TELEPHONE (OUTPATIENT)
Dept: FAMILY MEDICINE | Facility: CLINIC | Age: 54
End: 2024-05-28
Payer: COMMERCIAL

## 2024-05-28 NOTE — TELEPHONE ENCOUNTER
----- Message from Ross Ibarra MD sent at 5/28/2024  1:34 PM CDT -----  Team - please call patient with results.    Garryclaude Dumont,    I hope you have been well since our last visit. Below are the results from the testing completed at the visit.     The vitamin D and folic acid are low.  Dr. Ibarra recommends that you restart the folic acid and vitamin D as prescribed and take these medications once daily for at least a year.    The other labs are in process and we will reach out to you once those are completed.    Dr. Ibarra recommends that you continue on the plan as discussed in clinic.    If there are any questions or concerns, please call the clinic or schedule an appointment for follow up.     Best wishes,           Ross Ibarra MD  [facility]  5/28/2024  1:33 PM

## 2024-05-28 NOTE — TELEPHONE ENCOUNTER
Attempted to reach patient but no answer. Left message x1 with  to call back to go over test results.

## 2024-05-30 NOTE — TELEPHONE ENCOUNTER
Spoke to patient with Michelle . Relayed message and patient understood.  No further action needed.

## 2024-06-07 ENCOUNTER — ALLIED HEALTH/NURSE VISIT (OUTPATIENT)
Dept: NURSING | Facility: CLINIC | Age: 54
End: 2024-06-07

## 2024-06-07 DIAGNOSIS — Z71.89 OTHER SPECIFIED COUNSELING: Primary | Chronic | ICD-10-CM

## 2024-06-07 PROCEDURE — 99207 PR NO CHARGE LOS: CPT

## 2024-06-07 NOTE — PROGRESS NOTES
Clinic Care Coordination Contact  Follow Up Progress Note      Assessment: Patient walked in clinic today requesting assist with med refill. Patient states she wasn't able to  her meds. Confirmed by [facility] today that patient's insurance wasn't active. Per chart review, CHW sent a message to FRW on 5/14 to follow up on insurance status because it was going to end on 5/31/2024.     CCRN reviewed her medication list with patient as below:     Tylenol - able to buy OTC - has some in home    Baclofen 10mg PRN for muscle spasm - completely out but not needed now. Will fill it when insurance active again.     Famotidine 40mg - instructed patient that she can buy over the counter 20mg - take 2 tabs daily. Patient plans to buy it [facility] today.     Folic acid 1mg -  instructed patient that she can buy over the counter take 1 tab daily. Patient plans to buy it [facility] today.     Vit D 2000 international unit(s) daily. instructed patient that she can buy over the counter take 1 tab daily. Patient plans to buy it [facility] today.     A new FRW referral placed today. Instructed patient the important of answering her phone ro return call in a timely manner. Patient verbalized understanding. FRW goal created today.     Plan: Consider to refer patient to [facility] service if she agrees.

## 2024-06-10 ENCOUNTER — APPOINTMENT (OUTPATIENT)
Dept: INTERPRETER SERVICES | Facility: CLINIC | Age: 54
End: 2024-06-10

## 2024-06-10 ENCOUNTER — PATIENT OUTREACH (OUTPATIENT)
Dept: CARE COORDINATION | Facility: CLINIC | Age: 54
End: 2024-06-10

## 2024-06-25 ENCOUNTER — PATIENT OUTREACH (OUTPATIENT)
Dept: CARE COORDINATION | Facility: CLINIC | Age: 54
End: 2024-06-25

## 2024-07-15 ENCOUNTER — PATIENT OUTREACH (OUTPATIENT)
Dept: CARE COORDINATION | Facility: CLINIC | Age: 54
End: 2024-07-15
Payer: MEDICAID

## 2024-07-15 NOTE — PROGRESS NOTES
Clinic Care Coordination Contact  Patient has completed all goals with Clinic Care Coordination.  Please review the chart and confirm if maintenance is approved.    -[place] and it is active with [place] and no PMEP yet.   -FRW is still involved to assist patient for other County benefits.  -CHW assisted patient for preventive care visit with PCP.  -Patient was informed to call with questions or concerns.   -Chart review routed to [facility] to further review and advise.     [place] Department [place]: [place] Health [place] Eligibility Response (271)    SUBSCRIBER INFORMATION   Date of Service Subscriber ID Subscriber Name Birthdate Age Gender   07/15/2024 [id number] WAY PAW 1970 54 FEMALE          Address   [address] , [address] , [place]  [address]          PROVIDER INFORMATION   Provider ID Submitter Transaction ID Provider Name Taxonomy Code Qualifier Taxonomy Code   [id number] xx [facility]   This subscriber has eligibility for [place]: Medical Assistance.  Elig Type AA: Parent of a dependent child  Eligibility Begin Date: 07/01/2024  Eligibility End Date: --/--/----  This subscriber is eligible for the following service types: Medical Care ,  Chiropractic ,  Dental Care ,  Hospital ,  Hospital - Inpatient ,  Hospital - Outpatient ,  Emergency Services ,  Pharmacy ,  Professional (Physician) Visit - Office ,  Vision (Optometry) ,  Mental Health ,  Urgent Care   Prepaid Health Plan   None       Other Eligibility Information   No Special Transportation.  No Hospice.  County of residence is unknown.  Refer to Health Care Programs and Services Overview of the [facility] Provider Manual for a list of covered services.   Waivers   None     Subscriber Responsibility Information   None     Restricted Recipient Program   None       Medicare   None

## 2024-07-17 ENCOUNTER — PATIENT OUTREACH (OUTPATIENT)
Dept: CARE COORDINATION | Facility: CLINIC | Age: 54
End: 2024-07-17
Payer: MEDICAID

## 2024-07-17 NOTE — PROGRESS NOTES
Clinic Care Coordination Contact  Patient has completed all goals with Clinic Care Coordination.  Please review the chart and confirm if maintenance  is approved.    Amber Torres [facility]  [facility] Work [facility]   Phone: [phone]

## 2024-08-01 ENCOUNTER — PATIENT OUTREACH (OUTPATIENT)
Dept: CARE COORDINATION | Facility: CLINIC | Age: 54
End: 2024-08-01
Payer: MEDICAID

## 2024-08-28 ENCOUNTER — TELEPHONE (OUTPATIENT)
Dept: MAMMOGRAPHY | Facility: CLINIC | Age: 54
End: 2024-08-28
Payer: MEDICAID

## 2024-09-03 ENCOUNTER — PATIENT OUTREACH (OUTPATIENT)
Dept: CARE COORDINATION | Facility: CLINIC | Age: 54
End: 2024-09-03
Payer: COMMERCIAL

## 2024-09-03 NOTE — PROGRESS NOTES
Clinic Care Coordination Contact  Patient has completed all goals with Clinic Care Coordination.  Please review the chart and confirm if graduation is approved.    -Patient's health insurance is active with [place], PMAP is [facility].  -CHW referred this patient to [place] for ARM services and ARM worker will help with to reapply for [facility].  -Patient did not address or establish new goal and CHW reminded patient of preventive care visit.   -Chart review routed to [facility] to further review and advise. \      [place] E[place]ibility Response (271)    SUBSCRIBER INFORMATION   Date of Service Subscriber ID Subscriber Name Birthdate Age Gender   09/03/2024 [id number] WAY PAW 1970 54 FEMALE          Address   [address] , [address] , [place]  [address]          PROVIDER INFORMATION   Provider ID Submitter Transaction ID Provider Name Taxonomy Code Qualifier Taxonomy Code   [id number] xx [facility]   This subscriber has eligibility for [place]: [place].  Elig Type AA: Parent of a dependent child  Eligibility Begin Date: 07/01/2024  Eligibility End Date: --/--/----  This subscriber is eligible for the following service types: Medical Care ,  Chiropractic ,  Dental Care ,  Hospital ,  Hospital - Inpatient ,  Hospital - Outpatient ,  Emergency Services ,  Pharmacy ,  Professional (Physician) Visit - Office ,  Vision (Optometry) ,  Mental Health ,  Urgent Care   Prepaid Health Plan   This subscriber receives [place]12 - Prepaid Medical Assistance Program (PMAP) delivered through [facility]. The phone number is [phone].   Other Eligibility Information   No Special Transportation.  No Hospice.  County of residence is unknown.  Refer to Health Care Programs and Services Overview of the [facility] Provider Manual for a list of covered services.   Waivers   None     Subscriber  Responsibility Information   None     Restricted Recipient Program   None       Medicare   None

## 2024-09-04 ENCOUNTER — PATIENT OUTREACH (OUTPATIENT)
Dept: CARE COORDINATION | Facility: CLINIC | Age: 54
End: 2024-09-04
Payer: COMMERCIAL

## 2024-09-04 NOTE — LETTER
ACT 24    FENO 17     [facility] CARE COORDINATION  [address] [address]    September 4, 2024    Way Paw  [address]  [place] [address]    Dear Way,  Your Care Team congratulates you on your journey to maintain wellness. This document will help guide you on your journey to maintain a healthy lifestyle.  You can use this to help you overcome any barriers you may encounter.  If you should have any questions or concerns, you can contact the members of your Care Team or contact your Primary Care Clinic for assistance.     Health Maintenance  Health Maintenance Reviewed:      My Access Plan  Medical Emergency 911   Primary Clinic Line [facility] [phone]   24 Hour Appointment Line [phone] or  [phone]) (toll-free)   24 Hour Nurse Line [phone] (toll-free)   Preferred Urgent Care     Preferred Hospital     Preferred Pharmacy [id number] - [place], [place] - [address] Behavioral Health Crisis Line The National Suicide Prevention Lifeline at 1-508.158.5868 or 911     My Care Team Members  Patient Care Team         Relationship Specialty Notifications Start End    Ross Ibarra MD PCP - General Family Medicine  1/3/24     Phone: [phone] Fax: [phone]         [address] [address]    Jose Cruz Orosco CHW Community Health Worker Primary Care - CC Admissions 1/3/24 9/4/24    Phone: [phone] Fax: [phone]         [address] [address]    Ross Ibarra MD Assigned PCP   1/6/24     Phone: [phone] Fax: [phone]         [address] [place] [address]    Caitlyn Torres MSW Lead Care Coordinator  Admissions 1/8/24 9/4/24              Advance Care Plans/Directives Type:          It has been your Clinic Care Team's pleasure to work with you on accomplishing your goals.    Regards,  Your Clinic Care Team

## 2024-09-04 NOTE — PROGRESS NOTES
Clinic Care Coordination Contact    Assessment: Care Coordinator contacted patient for 2 month follow up.  Patient has continued to follow the plan of care and assessment is negative for any new needs or concerns.    Enrollment status: Graduated.      Plan: No further outreaches at this time.  Patient will continue to follow the plan of care.  If new needs arise a new Care Coordination referral may be placed.  FYI to PCP    Amber Torres [facility]  [facility]   Phone: [phone]

## 2024-10-09 ENCOUNTER — ANCILLARY PROCEDURE (OUTPATIENT)
Dept: MAMMOGRAPHY | Facility: CLINIC | Age: 54
End: 2024-10-09
Attending: FAMILY MEDICINE
Payer: COMMERCIAL

## 2024-10-09 ENCOUNTER — OFFICE VISIT (OUTPATIENT)
Dept: FAMILY MEDICINE | Facility: CLINIC | Age: 54
End: 2024-10-09
Payer: COMMERCIAL

## 2024-10-09 VITALS
HEIGHT: 56 IN | DIASTOLIC BLOOD PRESSURE: 58 MMHG | TEMPERATURE: 98.3 F | RESPIRATION RATE: 16 BRPM | HEART RATE: 68 BPM | SYSTOLIC BLOOD PRESSURE: 102 MMHG | BODY MASS INDEX: 28.74 KG/M2 | WEIGHT: 127.75 LBS | OXYGEN SATURATION: 100 %

## 2024-10-09 DIAGNOSIS — G56.02 CARPAL TUNNEL SYNDROME OF LEFT WRIST: ICD-10-CM

## 2024-10-09 DIAGNOSIS — M48.02 CERVICAL STENOSIS OF SPINAL CANAL: ICD-10-CM

## 2024-10-09 DIAGNOSIS — Z23 NEED FOR VACCINATION: ICD-10-CM

## 2024-10-09 DIAGNOSIS — Z12.11 COLON CANCER SCREENING: ICD-10-CM

## 2024-10-09 DIAGNOSIS — E53.8 LOW FOLATE: ICD-10-CM

## 2024-10-09 DIAGNOSIS — Z12.31 ENCOUNTER FOR SCREENING MAMMOGRAM FOR BREAST CANCER: ICD-10-CM

## 2024-10-09 DIAGNOSIS — E55.9 VITAMIN D DEFICIENCY: ICD-10-CM

## 2024-10-09 DIAGNOSIS — E78.2 MIXED HYPERLIPIDEMIA: ICD-10-CM

## 2024-10-09 DIAGNOSIS — M54.2 NECK PAIN: ICD-10-CM

## 2024-10-09 DIAGNOSIS — Z00.00 ENCOUNTER FOR ANNUAL PHYSICAL EXAM: Primary | ICD-10-CM

## 2024-10-09 DIAGNOSIS — R52 GENERALIZED BODY ACHES: ICD-10-CM

## 2024-10-09 LAB — FOLATE SERPL-MCNC: 5.1 NG/ML (ref 4.6–34.8)

## 2024-10-09 PROCEDURE — 80061 LIPID PANEL: CPT | Performed by: FAMILY MEDICINE

## 2024-10-09 PROCEDURE — 90673 RIV3 VACCINE NO PRESERV IM: CPT | Performed by: FAMILY MEDICINE

## 2024-10-09 PROCEDURE — 80053 COMPREHEN METABOLIC PANEL: CPT | Performed by: FAMILY MEDICINE

## 2024-10-09 PROCEDURE — 82306 VITAMIN D 25 HYDROXY: CPT | Performed by: FAMILY MEDICINE

## 2024-10-09 PROCEDURE — 36415 COLL VENOUS BLD VENIPUNCTURE: CPT | Performed by: FAMILY MEDICINE

## 2024-10-09 PROCEDURE — 90471 IMMUNIZATION ADMIN: CPT | Performed by: FAMILY MEDICINE

## 2024-10-09 PROCEDURE — T1013 SIGN LANG/ORAL INTERPRETER: HCPCS | Mod: U4 | Performed by: INTERPRETER

## 2024-10-09 PROCEDURE — 99396 PREV VISIT EST AGE 40-64: CPT | Mod: 25 | Performed by: FAMILY MEDICINE

## 2024-10-09 PROCEDURE — 82248 BILIRUBIN DIRECT: CPT | Performed by: FAMILY MEDICINE

## 2024-10-09 PROCEDURE — 77067 SCR MAMMO BI INCL CAD: CPT | Mod: TC | Performed by: RADIOLOGY

## 2024-10-09 PROCEDURE — 99214 OFFICE O/P EST MOD 30 MIN: CPT | Mod: 25 | Performed by: FAMILY MEDICINE

## 2024-10-09 PROCEDURE — 82746 ASSAY OF FOLIC ACID SERUM: CPT | Performed by: FAMILY MEDICINE

## 2024-10-09 PROCEDURE — T1013 SIGN LANG/ORAL INTERPRETER: HCPCS | Mod: U4

## 2024-10-09 RX ORDER — ACETAMINOPHEN 500 MG
1000 TABLET ORAL EVERY 8 HOURS PRN
Qty: 100 TABLET | Refills: 11 | Status: SHIPPED | OUTPATIENT
Start: 2024-10-09

## 2024-10-09 SDOH — HEALTH STABILITY: PHYSICAL HEALTH: ON AVERAGE, HOW MANY DAYS PER WEEK DO YOU ENGAGE IN MODERATE TO STRENUOUS EXERCISE (LIKE A BRISK WALK)?: 2 DAYS

## 2024-10-09 ASSESSMENT — SOCIAL DETERMINANTS OF HEALTH (SDOH): HOW OFTEN DO YOU GET TOGETHER WITH FRIENDS OR RELATIVES?: THREE TIMES A WEEK

## 2024-10-09 NOTE — PATIENT INSTRUCTIONS
-Thank you for choosing the [facility].  -It was a pleasure to see you today.  -Please take a look at the information below for more specific details regarding the treatment plan and recommendations.  -In this after visit summary is a list of your medications and specific instructions.  Please review this carefully as there may be changes made to your medication list.  -If there are any particular questions or concerns, please feel free to reach out to Dr. Ibarra.  -If any labs have been completed, we will reach out to you about results.  If the results are normal or not concerning, a letter or MyChart message will be sent to you.  If any follow-up is needed, either Dr. Ibarra or the nurse will give you a call.  If you have not heard regarding results after 2 weeks, please reach out to the clinic.    Patient Instructions:    -You are doing great.  -Do the exercises as given to you. Try to do this once daily.   -Continue to eat well.  Try to increase your servings of calcium as this can help your bones stay strong and healthy.  Follow a nutrition plan rich in fruits and vegetables and low in fats and cholesterol.    -Be sure to eat 5-7 servings of fruits and vegetables each day.  -Find ways to stay active.  Try to get 150 minutes of moderate activity (where you are breathing faster and slightly sweating) each week.  -Try to maintain a body mass index (BMI) of 18.5-25 as this is considered a healthier weight range.  -Brush your teeth twice daily.  See a dentist every 6-12 months.  -Be sure to use sunblock with SPF 15 or greater when going outside for extended periods of time.  Sunblock should be used even when it is a cloudy day.  Do intermittent skin checks for any concerning skin changes.  Wearing a wide brimmed hat and sunglasses can also be helpful to protect your skin from the sun.  -Monitor for any abnormal skin changes (such as new moles/spots, painful moles, changes in your old moles, wounds  that will not heal, multiple colors noted in one lesion, lesions that are asymmetric or not circular, or anything that is concerning for you). If any of these are noted, please schedule an appointment to be seen.     -It is generally recommended for you to complete a health care directive or living will. These documents will be able to reflect your wishes and desire in the case that you are unable to express them yourself. Please let Dr. Ibarra know if you would like some assistance with this process.    -For the preventive health procedure (such as colonoscopy, mammogram, etc) order from today, if you do not hear within a week's time from the specialist to schedule an appointment, please call the [facility] and speak with the specialty  to help you schedule the appointment. Depending on the specialist availability, it may be a number of weeks prior to your scheduled appointment.      Please seek immediate medical attention (go to the emergency room or urgent care) for the following reasons: worsening symptoms, or any concerning changes.      --------------------------------------------------------------------------------------------------------------------    -We are always looking for ways to improve.  You may be selected to receive a survey regarding your visit today.  We encourage you to complete the survey and provide specific, constructive feedback to help us improve our processes.  Thank you for your time!  -Please review the contact information listed on the after visit summary and in the electronic chart.  Below is the phone number that we have on file.  If there are any changes that are needed to be made, please reach out to the clinic.  [phone] (home)

## 2024-10-09 NOTE — PROGRESS NOTES
Preventive Care Visit  [facility] GIANNA Ibarra MD, Family Medicine  Oct 9, 2024      Assessment & Plan     Patient Instructions:    -You are doing great.  -Do the exercises as given to you. Try to do this once daily.   -Continue to eat well.  Try to increase your servings of calcium as this can help your bones stay strong and healthy.  Follow a nutrition plan rich in fruits and vegetables and low in fats and cholesterol.    -Be sure to eat 5-7 servings of fruits and vegetables each day.  -Find ways to stay active.  Try to get 150 minutes of moderate activity (where you are breathing faster and slightly sweating) each week.  -Try to maintain a body mass index (BMI) of 18.5-25 as this is considered a healthier weight range.  -Brush your teeth twice daily.  See a dentist every 6-12 months.  -Be sure to use sunblock with SPF 15 or greater when going outside for extended periods of time.  Sunblock should be used even when it is a cloudy day.  Do intermittent skin checks for any concerning skin changes.  Wearing a wide brimmed hat and sunglasses can also be helpful to protect your skin from the sun.  -Monitor for any abnormal skin changes (such as new moles/spots, painful moles, changes in your old moles, wounds that will not heal, multiple colors noted in one lesion, lesions that are asymmetric or not circular, or anything that is concerning for you). If any of these are noted, please schedule an appointment to be seen.     -It is generally recommended for you to complete a health care directive or living will. These documents will be able to reflect your wishes and desire in the case that you are unable to express them yourself. Please let Dr. Ibarra know if you would like some assistance with this process.    -For the preventive health procedure (such as colonoscopy, mammogram, etc) order from today, if you do not hear within a week's time from the specialist to schedule an appointment, please  "call the [facility] and speak with the specialty  to help you schedule the appointment. Depending on the specialist availability, it may be a number of weeks prior to your scheduled appointment.      Please seek immediate medical attention (go to the emergency room or urgent care) for the following reasons: worsening symptoms, or any concerning changes.      Way was seen today for physical and hand problem.  Diagnoses and all orders for this visit:    Encounter for annual physical exam: colonoscopy ordered (screening, personal history of polyps).     Encounter for screening mammogram for breast cancer  -     MA Screen Bilateral w/Nelson; Future    Need for vaccination  -     INFLUENZA VACCINE TRIVALENT(FLUBLOK)    Carpal tunnel syndrome of left wrist: patient interested in surgery. Referred as below.   -     Orthopedic  Referral; Future    Vitamin D deficiency: restart high dose vit D.   -     Vitamin D Deficiency; Future    Low folate  -     Folate; Future    Mixed hyperlipidemia: recheck as below. Start atorvastatin 40mg at bedtime.   -     Lipid panel reflex to direct LDL Fasting; Future  -     Hepatic function panel; Future  -     Basic metabolic panel; Future    Generalized body aches  Neck pain  Cervical stenosis of spinal canal: stable. Improved with tylenol. Plan as below.   -     acetaminophen (TYLENOL) 500 MG tablet; Take 2 tablets (1,000 mg) by mouth every 8 hours as needed for pain or fever.        Patient has been advised of split billing requirements and indicates understanding: Yes        BMI  Estimated body mass index is 28.94 kg/m  as calculated from the following:    Height as of this encounter: 1.415 m (4' 7.71\").    Weight as of this encounter: 57.9 kg (127 lb 12 oz).       Counseling  Appropriate preventive services were addressed with this patient via screening, questionnaire, or discussion as appropriate for fall prevention, nutrition, physical activity, Tobacco-use " cessation, social engagement, weight loss and cognition.  Checklist reviewing preventive services available has been given to the patient.  Reviewed patient's diet, addressing concerns and/or questions.   She is at risk for lack of exercise and has been provided with information to increase physical activity for the benefit of her well-being.   The patient was instructed to see the dentist every 6 months.         Subjective   Way is a 54 year old, presenting for the following:  Physical and Hand Problem (Numbness on left.  Had procedure on the right hand and it has helped )    Hand numbness: history of CTS on the left. Symptoms are ongoing and she gets worsening symptoms at night. Also causing some pain in the palms and all the finger, less on the thumb. Discussed treatment options and recommendations. Patient does have a brace at home that she uses and that doesn't help that much anymore. She has completed shot in the left wrist before and it helped for some time and then the symptoms come back.     Reports history surgery for CTS on the right with some improvement in symptoms.     Testing from 05/21/2024 demosntrated low vitamin D and folate. Patient prescribed medications, though reports not taking them. Last dose was a few months ago due to insurance being inactive. Plan for labs first. Treat as appropriate.               10/9/2024    11:29 AM   Additional Questions   Roomed by Christy LIRA   Accompanied by self     A professional Michelle  was utilized for the office visit.      Health Care Directive  Patient does not have a Health Care Directive or Living Will: Discussed advance care planning with patient; information given to patient to review.    HPI    -Reviewed healthy eating and exercise.  -Skin cancer screening: No concerning skin changes expressed by patient.  -Smoking status:   Tobacco Use      Smoking status: Never        Passive exposure: Never      Smokeless tobacco: Never      Tobacco  comments: Chew betel nut with tobacco    Reviewed smoking cessation recommendations.  -Family history: CAD, HTN, DM: none  Family History   Problem Relation Age of Onset    Breast Cancer No family hx of     Ovarian Cancer No family hx of     Coronary Artery Disease No family hx of     Diabetes No family hx of     Hypertension No family hx of        Preventative health recommendations, evaluation options, and risk/benefits of each were discussed with patient. Accepted recommendations were ordered. Otherwise, patient declined.  Health Maintenance Due   Topic Date Due    YEARLY PREVENTIVE VISIT  Never done    ZOSTER IMMUNIZATION (1 of 2) Never done    COVID-19 Vaccine (1 - 2024-25 season) Never done       -Immunizations due were reviewed.    Immunization History   Administered Date(s) Administered    Influenza Vaccine >6 months,quad, PF 12/04/2017    Influenza Vaccine Trivalent (FluBlok) 10/09/2024    Influenza Vaccine, 6+MO IM (QUADRIVALENT W/PRESERVATIVES) 10/05/2016    TDAP (Adacel,Boostrix) 01/11/2017       -Labs: Laboratory recommendations reviewed with patient.  -STD testing recommended (including HIV, Hep C)    -Breast cancer screening: biennial screening mammography for women aged 50 to 74 years. Last mammogram:   Narrative & Impression   BILATERAL FULL FIELD DIGITAL SCREENING MAMMOGRAM      Performed on: 7/2/18        Compared to: 02/27/2017 Mammo Screening Bilateral, and 02/20/2015 MAMMO SCREENING BILATERAL     Findings: The breasts have scattered areas of fibroglandular densities. There is no radiographic evidence of malignancy. This study was evaluated with the assistance of Computer-Aided Detection. Continue routine screening mammogram as recommended.     ACR BI-RADS Category 2: Benign       -Cervical cancer screening: Last Pap smear: 05/21/2024. NILM. No high risk HPV.     -Colon cancer screening: Last colonoscopy: 04/21/2015. Rectal polyps noted. Due for repeat colonoscopy. Discussed recommendations and  plans are for colonoscopy.           10/9/2024   General Health   How would you rate your overall physical health? Excellent   Feel stress (tense, anxious, or unable to sleep) Very much      (!) STRESS CONCERN      10/9/2024   Nutrition   Three or more servings of calcium each day? Yes   Diet: Regular (no restrictions)   How many servings of fruit and vegetables per day? (!) 2-3   How many sweetened beverages each day? 0-1            10/9/2024   Exercise   Days per week of moderate/strenous exercise 2 days      (!) EXERCISE CONCERN      10/9/2024   Social Factors   Frequency of gathering with friends or relatives Three times a week   Worry food won't last until get money to buy more No   Food not last or not have enough money for food? No   Do you have housing? (Housing is defined as stable permanent housing and does not include staying ouside in a car, in a tent, in an abandoned building, in an overnight shelter, or couch-surfing.) Yes   Are you worried about losing your housing? No   Lack of transportation? Yes   Unable to get utilities (heat,electricity)? No       (!) TRANSPORTATION CONCERN PRESENT      10/9/2024   Fall Risk   Fallen 2 or more times in the past year? Yes   Trouble with walking or balance? Yes   Gait Speed Test Interpretation Less than or equal to 5.00 seconds - PASS      Has some pain behind the left knee. When she walks and puts pressure on it, it can cause some pains. Pain comes and goes.          10/9/2024   Dental   Dentist two times every year? (!) NO            10/9/2024   TB Screening   Were you born outside of the [place]? Yes              Today's PHQ-2 Score:       1/3/2024     2:57 PM   PHQ-2 ( 1999 Pfizer)   Q1: Little interest or pleasure in doing things 1   Q2: Feeling down, depressed or hopeless 0   PHQ-2 Score 1         10/9/2024   Substance Use   Alcohol more than 3/day or more than 7/wk No   Do you use any other substances recreationally? No        Social History     Tobacco Use     "Smoking status: Never     Passive exposure: Never    Smokeless tobacco: Never    Tobacco comments:     Chew betel nut with tobacco   Vaping Use    Vaping status: Never Used             10/9/2024   STI Screening   New sexual partner(s) since last STI/HIV test? No        History of abnormal Pap smear: No.        Latest Ref Rng & Units 5/21/2024    10:54 AM   PAP / HPV   PAP  Negative for Intraepithelial Lesion or Malignancy (NILM)    HPV 16 DNA Negative Negative    HPV 18 DNA Negative Negative    Other HR HPV Negative Negative      ASCVD Risk   The 10-year ASCVD risk score (Gretta STINSON, et al., 2019) is: 2.1%    Values used to calculate the score:      Age: 54 years      Sex: Female      Is Non- : No      Diabetic: No      Tobacco smoker: No      Systolic Blood Pressure: 102 mmHg      Is BP treated: No      HDL Cholesterol: 45 mg/dL      Total Cholesterol: 245 mg/dL      Reviewed and updated as needed this visit by Provider         Twan Sarmiento            No past medical history on file.  No past surgical history on file.  **Right CTS surgery.     The 10 point review of system was negative unless otherwise stated in the HPI.       Objective    Exam  /58   Pulse 68   Temp 98.3  F (36.8  C) (Oral)   Resp 16   Ht 1.415 m (4' 7.71\")   Wt 57.9 kg (127 lb 12 oz)   SpO2 100%   BMI 28.94 kg/m     Estimated body mass index is 28.94 kg/m  as calculated from the following:    Height as of this encounter: 1.415 m (4' 7.71\").    Weight as of this encounter: 57.9 kg (127 lb 12 oz).    Physical Exam          Signed Electronically by: Ross Ibarra MD    "

## 2024-10-10 LAB
ALBUMIN SERPL BCG-MCNC: 4.2 G/DL (ref 3.5–5.2)
ALP SERPL-CCNC: 88 U/L (ref 40–150)
ALT SERPL W P-5'-P-CCNC: 18 U/L (ref 0–50)
ANION GAP SERPL CALCULATED.3IONS-SCNC: 11 MMOL/L (ref 7–15)
AST SERPL W P-5'-P-CCNC: 24 U/L (ref 0–45)
BILIRUB DIRECT SERPL-MCNC: <0.2 MG/DL (ref 0–0.3)
BILIRUB SERPL-MCNC: <0.2 MG/DL
BUN SERPL-MCNC: 15.9 MG/DL (ref 6–20)
CALCIUM SERPL-MCNC: 8.8 MG/DL (ref 8.8–10.4)
CHLORIDE SERPL-SCNC: 104 MMOL/L (ref 98–107)
CHOLEST SERPL-MCNC: 245 MG/DL
CREAT SERPL-MCNC: 0.72 MG/DL (ref 0.51–0.95)
EGFRCR SERPLBLD CKD-EPI 2021: >90 ML/MIN/1.73M2
FASTING STATUS PATIENT QL REPORTED: ABNORMAL
FASTING STATUS PATIENT QL REPORTED: NORMAL
GLUCOSE SERPL-MCNC: 78 MG/DL (ref 70–99)
HCO3 SERPL-SCNC: 26 MMOL/L (ref 22–29)
HDLC SERPL-MCNC: 45 MG/DL
LDLC SERPL CALC-MCNC: 122 MG/DL
NONHDLC SERPL-MCNC: 200 MG/DL
POTASSIUM SERPL-SCNC: 3.8 MMOL/L (ref 3.4–5.3)
PROT SERPL-MCNC: 7.4 G/DL (ref 6.4–8.3)
SODIUM SERPL-SCNC: 141 MMOL/L (ref 135–145)
TRIGL SERPL-MCNC: 391 MG/DL
VIT D+METAB SERPL-MCNC: 17 NG/ML (ref 20–50)

## 2024-10-11 ENCOUNTER — TELEPHONE (OUTPATIENT)
Dept: FAMILY MEDICINE | Facility: CLINIC | Age: 54
End: 2024-10-11
Payer: COMMERCIAL

## 2024-10-11 NOTE — TELEPHONE ENCOUNTER
Called patient and relayed the following message to the patient. Patient understood the message and did not have any further questions or concerns.      Patient is agree to start the vitamin D and cholesterol medication.

## 2024-10-11 NOTE — TELEPHONE ENCOUNTER
----- Message from Ross Ibarra sent at 10/10/2024  2:05 PM CDT -----  Team - please call patient with results.    Garryclaude Dumont,    I hope you have been well since our last visit. Below are the results from the testing completed at the visit.     The total cholesterol and LDL or bad cholesterol are high.  The triglycerides, which is primarily affected by food, is high. The HDL or good cholesterol are in the low range.  No medications are recommended at this time, though you should try to follow a diet that is rich in fruits and vegetables and low in fats and cholesterol.  In addition, find ways to stay active.  Doing aerobic activities (such as running, biking, etc.) will help improve the HDL or good cholesterol.  Weight loss is also recommended.    -The vitamin D level was low.  Dr. Ibarra recommends starting a high-dose once weekly vitamin D replacement followed by a low-dose daily vitamin D supplement.   -In addition, you should get at least 15 minutes of direct sun exposure on the skin of the arms and face each day to help improve the vitamin D.    -Rechecking the vitamin D in 3-6 months would be recommended.    The other labs are in the normal range.    Please let the nurse know if you would be willing to start the cholesterol medication, vitamin D    Otherwise, Dr. Ibarra recommends that you continue on the plan as discussed in clinic.    If there are any questions or concerns, please call the clinic or schedule an appointment for follow up.     Best wishes,           Ross Ibarra MD  [facility]  10/10/2024  2:03 PM

## 2024-10-15 RX ORDER — CHOLECALCIFEROL (VITAMIN D3) 50 MCG
1 TABLET ORAL DAILY
Qty: 90 TABLET | Refills: 3 | Status: SHIPPED | OUTPATIENT
Start: 2024-12-11

## 2024-10-15 RX ORDER — ATORVASTATIN CALCIUM 40 MG/1
40 TABLET, FILM COATED ORAL AT BEDTIME
Qty: 90 TABLET | Refills: 3 | Status: SHIPPED | OUTPATIENT
Start: 2024-10-15

## 2024-10-15 RX ORDER — ERGOCALCIFEROL 1.25 MG/1
50000 CAPSULE, LIQUID FILLED ORAL WEEKLY
Qty: 8 CAPSULE | Refills: 0 | Status: SHIPPED | OUTPATIENT
Start: 2024-10-15 | End: 2024-12-04

## 2024-10-15 NOTE — TELEPHONE ENCOUNTER
Orders placed as requested.     Ross Ibarra MD  [facility]  10/15/2024  7:25 AM

## 2024-10-29 ENCOUNTER — ALLIED HEALTH/NURSE VISIT (OUTPATIENT)
Dept: NURSING | Facility: CLINIC | Age: 54
End: 2024-10-29
Payer: COMMERCIAL

## 2024-10-29 ENCOUNTER — PATIENT OUTREACH (OUTPATIENT)
Dept: CARE COORDINATION | Facility: CLINIC | Age: 54
End: 2024-10-29

## 2024-10-29 DIAGNOSIS — Z71.89 OTHER SPECIFIED COUNSELING: Primary | Chronic | ICD-10-CM

## 2024-10-29 PROCEDURE — 99207 PR NO CHARGE LOS: CPT

## 2024-10-29 NOTE — PROGRESS NOTES
Clinic Care Coordination Contact  Care Team Conversations    Pt walked into clinic asking for support renewing her health care. CCSW was aunable to meet with her at that time. Copies made of paperwork and financial resources worker referral completed. Contacted pt with Michelle cliftoner and let her know that FRW will be reaching out.       JODIE Polanco, [facility]  Social Work Care Coordinator

## 2024-10-31 ENCOUNTER — PATIENT OUTREACH (OUTPATIENT)
Dept: CARE COORDINATION | Facility: CLINIC | Age: 54
End: 2024-10-31
Payer: COMMERCIAL

## 2024-11-01 ENCOUNTER — PATIENT OUTREACH (OUTPATIENT)
Dept: CARE COORDINATION | Facility: CLINIC | Age: 54
End: 2024-11-01
Payer: COMMERCIAL

## 2024-11-06 ENCOUNTER — APPOINTMENT (OUTPATIENT)
Dept: CARE COORDINATION | Facility: CLINIC | Age: 54
End: 2024-11-06
Payer: COMMERCIAL

## 2024-11-06 ENCOUNTER — PATIENT OUTREACH (OUTPATIENT)
Dept: CARE COORDINATION | Facility: CLINIC | Age: 54
End: 2024-11-06

## 2024-12-07 DIAGNOSIS — E55.9 VITAMIN D DEFICIENCY: ICD-10-CM

## 2024-12-10 RX ORDER — ERGOCALCIFEROL 1.25 MG/1
CAPSULE, LIQUID FILLED ORAL
Qty: 8 CAPSULE | Refills: 0 | OUTPATIENT
Start: 2024-12-10